# Patient Record
Sex: MALE | Race: WHITE | Employment: FULL TIME | URBAN - METROPOLITAN AREA
[De-identification: names, ages, dates, MRNs, and addresses within clinical notes are randomized per-mention and may not be internally consistent; named-entity substitution may affect disease eponyms.]

---

## 2017-02-02 ENCOUNTER — TRANSFERRED RECORDS (OUTPATIENT)
Dept: HEALTH INFORMATION MANAGEMENT | Facility: CLINIC | Age: 47
End: 2017-02-02

## 2018-07-26 ENCOUNTER — TRANSFERRED RECORDS (OUTPATIENT)
Dept: HEALTH INFORMATION MANAGEMENT | Facility: CLINIC | Age: 48
End: 2018-07-26

## 2018-09-13 ENCOUNTER — TRANSFERRED RECORDS (OUTPATIENT)
Dept: HEALTH INFORMATION MANAGEMENT | Facility: CLINIC | Age: 48
End: 2018-09-13

## 2018-10-09 ENCOUNTER — PRE VISIT (OUTPATIENT)
Dept: SURGERY | Facility: CLINIC | Age: 48
End: 2018-10-09

## 2018-10-09 NOTE — TELEPHONE ENCOUNTER
Date of appointment: 10/24/2018   Diagnosis/reason for appointment:  Diaphragm Parlaysis    Additional information:  [facility]

## 2018-10-09 NOTE — LETTER
10/9/2018        RE: Hal Clark  [address]  [place] [address]        Date of appointment: 10/24/2018   Diagnosis/reason for appointment:  Diaphragm Parlaysis    Additional information:  [facility]        October 9, 2018 3:29 PM  Records received from [place] and faxed to HIM    I called Mr Clark today to notify him of his surgery being cancelled for tomorrow (Lap / thoracoscopic R diaphragm plication).     He was seen in PAC 10/26 and was found to have hyponatremia, mildly elevated AST, ALT, total bili, and thrombocytopenia. INR was not checked. He does not have history of known liver disease however, he admits to 2-3 vodka drinks a day. I am afraid he might have undiagnosed cirrhosis that is not currently treated.     We decided to cancel surgery until he undergoes further liver work up and optimization from medical standpoint. We will have our office coordinate with him a follow up so we can schedule him for surgery as soon as possible.     Winston Colon MD          Sincerely,        Kolton Robbins MD
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
## 2018-10-24 ENCOUNTER — TELEPHONE (OUTPATIENT)
Dept: SURGERY | Facility: CLINIC | Age: 48
End: 2018-10-24

## 2018-10-24 ENCOUNTER — OFFICE VISIT (OUTPATIENT)
Dept: SURGERY | Facility: CLINIC | Age: 48
End: 2018-10-24
Attending: THORACIC SURGERY (CARDIOTHORACIC VASCULAR SURGERY)
Payer: COMMERCIAL

## 2018-10-24 ENCOUNTER — HOSPITAL ENCOUNTER (INPATIENT)
Facility: CLINIC | Age: 48
Setting detail: SURGERY ADMIT
End: 2018-10-24
Attending: THORACIC SURGERY (CARDIOTHORACIC VASCULAR SURGERY) | Admitting: THORACIC SURGERY (CARDIOTHORACIC VASCULAR SURGERY)
Payer: COMMERCIAL

## 2018-10-24 VITALS
BODY MASS INDEX: 40.05 KG/M2 | WEIGHT: 286.1 LBS | HEART RATE: 81 BPM | OXYGEN SATURATION: 95 % | HEIGHT: 71 IN | SYSTOLIC BLOOD PRESSURE: 131 MMHG | DIASTOLIC BLOOD PRESSURE: 90 MMHG | TEMPERATURE: 98.4 F

## 2018-10-24 DIAGNOSIS — J98.6 DIAPHRAGM PARALYSIS: Primary | ICD-10-CM

## 2018-10-24 DIAGNOSIS — J98.6 PARALYZED HEMIDIAPHRAGM: ICD-10-CM

## 2018-10-24 PROCEDURE — G0463 HOSPITAL OUTPT CLINIC VISIT: HCPCS | Mod: ZF

## 2018-10-24 RX ORDER — PROPRANOLOL HYDROCHLORIDE 80 MG/1
80 CAPSULE, EXTENDED RELEASE ORAL EVERY MORNING
COMMUNITY
Start: 2018-05-31

## 2018-10-24 RX ORDER — SUMATRIPTAN 100 MG/1
100 TABLET, FILM COATED ORAL
COMMUNITY
Start: 2018-08-20

## 2018-10-24 RX ORDER — TRIAMTERENE/HYDROCHLOROTHIAZID 37.5-25 MG
TABLET ORAL
COMMUNITY
Start: 2018-10-17 | End: 2018-11-20

## 2018-10-24 RX ORDER — ACETAMINOPHEN 500 MG
500-1000 TABLET ORAL
COMMUNITY
Start: 2018-03-06 | End: 2018-10-26

## 2018-10-24 RX ORDER — CEFAZOLIN SODIUM IN 0.9 % NACL 3 G/100 ML
3 INTRAVENOUS SOLUTION, PIGGYBACK (ML) INTRAVENOUS
Status: CANCELLED | OUTPATIENT
Start: 2018-10-24

## 2018-10-24 RX ORDER — ALBUTEROL SULFATE 90 UG/1
2 AEROSOL, METERED RESPIRATORY (INHALATION) PRN
COMMUNITY
Start: 2018-06-27

## 2018-10-24 RX ORDER — FLUTICASONE PROPIONATE 50 MCG
2 SPRAY, SUSPENSION (ML) NASAL EVERY MORNING
COMMUNITY
Start: 2018-05-30

## 2018-10-24 RX ORDER — HEPARIN SODIUM 5000 [USP'U]/.5ML
5000 INJECTION, SOLUTION INTRAVENOUS; SUBCUTANEOUS
Status: CANCELLED | OUTPATIENT
Start: 2018-10-24

## 2018-10-24 RX ORDER — CEFAZOLIN SODIUM 1 G/50ML
1 INJECTION, SOLUTION INTRAVENOUS SEE ADMIN INSTRUCTIONS
Status: CANCELLED | OUTPATIENT
Start: 2018-10-24

## 2018-10-24 RX ORDER — CYCLOBENZAPRINE HCL 10 MG
10 TABLET ORAL PRN
COMMUNITY
Start: 2018-05-15

## 2018-10-24 RX ORDER — BUSPIRONE HYDROCHLORIDE 5 MG/1
5 TABLET ORAL EVERY MORNING
COMMUNITY
Start: 2018-03-19 | End: 2019-03-19

## 2018-10-24 ASSESSMENT — PAIN SCALES - GENERAL: PAINLEVEL: NO PAIN (0)

## 2018-10-24 NOTE — TELEPHONE ENCOUNTER
Spoke with patient to schedule surgery with Dr Winston Gallagher    Surgery was scheduled on 10/29/18 at [facility]    Patient will have H&P with PAC on 10/26/18    Post-Op care appointment scheduled?  YES 11/27/18 post op, PFT's, Chest XR    Patient is aware a / is needed day of surgery.     Surgery packet will be brought to him during PAC, patient has my direct contact information for any further questions.     Additional Comments:

## 2018-10-24 NOTE — NURSING NOTE
"Oncology Rooming Note    October 24, 2018 1:26 PM   Hal Clark is a 47 year old male who presents for:    Chief Complaint   Patient presents with     Oncology Clinic Visit     New; Diaphragm Paralysis     Initial Vitals: /90  Pulse 81  Temp 98.4  F (36.9  C) (Oral)  Ht 1.8 m (5' 10.87\")  Wt 129.8 kg (286 lb 1.6 oz)  SpO2 95%  BMI 40.05 kg/m2 Estimated body mass index is 40.05 kg/(m^2) as calculated from the following:    Height as of this encounter: 1.8 m (5' 10.87\").    Weight as of this encounter: 129.8 kg (286 lb 1.6 oz). Body surface area is 2.55 meters squared.  No Pain (0) Comment: Data Unavailable   No LMP for male patient.  Allergies reviewed: Yes  Medications reviewed: Yes    Medications: Medication refills not needed today.  Pharmacy name entered into EPIC: Data Unavailable    Clinical concerns:      10 minutes for nursing intake (face to face time)     Lissette Harden CMA              "

## 2018-10-24 NOTE — MR AVS SNAPSHOT
After Visit Summary   10/24/2018    Hal Clark    MRN: [id number]           Patient Information     Date Of Birth          1970        Visit Information        Provider Department      10/24/2018 12:30 PM Winston Yeh MD [facility]        Today's Diagnoses     Diaphragm paralysis    -  1    Paralyzed hemidiaphragm           Follow-ups after your visit        Additional Services     PAC Visit Referral (For [facility] Only)       Does this visit require an Anesthesia consult?  [facility]    H&P done by:  Other (Specify): PAC      Please be aware that coverage of these services is subject to the terms and limitations of your health insurance plan.  Call member services at your health plan with any benefit or coverage questions.      Please bring the following to your appointment:  >>   Any x-rays, CTs or MRIs which have been performed.  Contact the facility where they were done to arrange for  prior to your scheduled appointment.  Any new CT, MRI or other procedures ordered by your specialist must be performed at a [facility] facility or coordinated by your clinic's referral office.    >>   List of current medications  >>   This referral request   >>   Any documents/labs given to you for this referral                  Your next 10 appointments already scheduled     Oct 29, 2018   Procedure with Winston Colon MD   [facility], Same Day Surgery (--)    [address]  [facility] [place] [id number] [phone]              Future tests that were ordered for you today     Open Future Orders        Priority Expected Expires Ordered    X-ray Chest 2 vws* Routine 10/24/2018 10/24/2019 10/24/2018    Pulmonary function test procedure (Sitting/Supine)  [PFT13] Routine  10/24/2019 10/24/2018            Who to contact     If you have questions or need follow up information about today's clinic visit or your schedule please contact [facility] directly at  "[phone].  Normal or non-critical lab and imaging results will be communicated to you by MyChart, letter or phone within 4 business days after the clinic has received the results. If you do not hear from us within 7 days, please contact the clinic through [employer] or phone. If you have a critical or abnormal lab result, we will notify you by phone as soon as possible.  Submit refill requests through [employer] or call your pharmacy and they will forward the refill request to us. Please allow 3 business days for your refill to be completed.          Additional Information About Your Visit        [employer] Information     [employer] lets you send messages to your doctor, view your test results, renew your prescriptions, schedule appointments and more. To sign up, go to [email or web address] . Click on \"Log in\" on the left side of the screen, which will take you to the Welcome page. Then click on \"Sign up Now\" on the right side of the page.     You will be asked to enter the access code listed below, as well as some personal information. Please follow the directions to create your username and password.     Your access code is: [email or web address]  Expires: 2019  2:30 PM     Your access code will  in 90 days. If you need help or a new code, please call your [facility] clinic or [phone].        Care EveryWhere ID     This is your Care EveryWhere ID. This could be used by other organizations to access your [place] medical records  [id number]        Your Vitals Were     Pulse Temperature Height Pulse Oximetry BMI (Body Mass Index)       81 98.4  F (36.9  C) (Oral) 1.8 m (5' 10.87\") 95% 40.05 kg/m2        Blood Pressure from Last 3 Encounters:   10/24/18 131/90    Weight from Last 3 Encounters:   10/24/18 129.8 kg (286 lb 1.6 oz)              We Performed the Following     PAC Visit Referral (For [facility] Only)     [place]-Operative Worksheet (Thoracic)        Primary Care Provider    None Specified       No primary " provider on file.        Equal Access to Services     VASU [place]IN : Hadii aad ku hadjuliannjesus alberto Daleali, wawendida luskipha, qaangieta karomelchristelle rawls. So [facility] [phone].    ATENCIÓN: Si habla español, tiene a godoy disposición servicios gratuitos de asistencia lingüística. [facility] [phone].    We comply with applicable federal civil rights laws and [place] laws. We do not discriminate on the basis of race, color, national origin, age, disability, sex, sexual orientation, or gender identity.            Thank you!     Thank you for choosing [facility]  for your care. Our goal is always to provide you with excellent care. Hearing back from our patients is one way we can continue to improve our services. Please take a few minutes to complete the written survey that you may receive in the mail after your visit with us. Thank you!             Your Updated Medication List - Protect others around you: Learn how to safely use, store and throw away your medicines at www.disposemymeds.org.          This list is accurate as of 10/24/18  4:07 PM.  Always use your most recent med list.                   Brand Name Dispense Instructions for use Diagnosis    acetaminophen 500 MG tablet    TYLENOL     Take 500-1,000 mg by mouth        albuterol 108 (90 Base) MCG/ACT inhaler    PROAIR HFA/PROVENTIL HFA/VENTOLIN HFA     Inhale 2 puffs into the lungs        busPIRone 5 MG tablet    BUSPAR     Take 5 mg by mouth        cyclobenzaprine 10 MG tablet    FLEXERIL     Take 10 mg by mouth        fluticasone 50 MCG/ACT spray    FLONASE     2 sprays        propranolol 80 MG 24 hr capsule    INDERAL LA     Take 80 mg by mouth        sertraline 50 MG tablet    ZOLOFT     TAKE 1 TABLET BY MOUTH EVERY DAY        SUMAtriptan 100 MG tablet    IMITREX     Take 100 mg by mouth        triamterene-hydrochlorothiazide 37.5-25 MG per tablet    MAXZIDE-25     TAKE 0.5 TABLETS BY MOUTH DAILY.

## 2018-10-25 ENCOUNTER — ANESTHESIA EVENT (OUTPATIENT)
Dept: SURGERY | Facility: CLINIC | Age: 48
End: 2018-10-25

## 2018-10-26 ENCOUNTER — OFFICE VISIT (OUTPATIENT)
Dept: SURGERY | Facility: CLINIC | Age: 48
End: 2018-10-26
Payer: COMMERCIAL

## 2018-10-26 VITALS
SYSTOLIC BLOOD PRESSURE: 105 MMHG | HEART RATE: 92 BPM | BODY MASS INDEX: 39.89 KG/M2 | TEMPERATURE: 97.9 F | RESPIRATION RATE: 18 BRPM | OXYGEN SATURATION: 95 % | WEIGHT: 284.9 LBS | DIASTOLIC BLOOD PRESSURE: 79 MMHG | HEIGHT: 71 IN

## 2018-10-26 DIAGNOSIS — Z01.818 PREOP EXAMINATION: ICD-10-CM

## 2018-10-26 DIAGNOSIS — Z01.818 PREOP EXAMINATION: Primary | ICD-10-CM

## 2018-10-26 LAB
ABO + RH BLD: NORMAL
ABO + RH BLD: NORMAL
ALBUMIN SERPL-MCNC: 3.8 G/DL (ref 3.4–5)
ALP SERPL-CCNC: 70 U/L (ref 40–150)
ALT SERPL W P-5'-P-CCNC: 69 U/L (ref 0–70)
ANION GAP SERPL CALCULATED.3IONS-SCNC: 12 MMOL/L (ref 3–14)
AST SERPL W P-5'-P-CCNC: 115 U/L (ref 0–45)
BILIRUB SERPL-MCNC: 1.7 MG/DL (ref 0.2–1.3)
BLD GP AB SCN SERPL QL: NORMAL
BLOOD BANK CMNT PATIENT-IMP: NORMAL
BLOOD BANK CMNT PATIENT-IMP: NORMAL
BUN SERPL-MCNC: 12 MG/DL (ref 7–30)
CALCIUM SERPL-MCNC: 9.1 MG/DL (ref 8.5–10.1)
CHLORIDE SERPL-SCNC: 87 MMOL/L (ref 94–109)
CO2 SERPL-SCNC: 30 MMOL/L (ref 20–32)
CREAT SERPL-MCNC: 0.87 MG/DL (ref 0.66–1.25)
ERYTHROCYTE [DISTWIDTH] IN BLOOD BY AUTOMATED COUNT: 14.5 % (ref 10–15)
GFR SERPL CREATININE-BSD FRML MDRD: >90 ML/MIN/1.7M2
GLUCOSE SERPL-MCNC: 93 MG/DL (ref 70–99)
HCT VFR BLD AUTO: 41.8 % (ref 40–53)
HGB BLD-MCNC: 14.4 G/DL (ref 13.3–17.7)
MCH RBC QN AUTO: 34 PG (ref 26.5–33)
MCHC RBC AUTO-ENTMCNC: 34.4 G/DL (ref 31.5–36.5)
MCV RBC AUTO: 99 FL (ref 78–100)
PLATELET # BLD AUTO: 85 10E9/L (ref 150–450)
POTASSIUM SERPL-SCNC: 3.8 MMOL/L (ref 3.4–5.3)
PROT SERPL-MCNC: 8.6 G/DL (ref 6.8–8.8)
RBC # BLD AUTO: 4.24 10E12/L (ref 4.4–5.9)
SODIUM SERPL-SCNC: 129 MMOL/L (ref 133–144)
SPECIMEN EXP DATE BLD: NORMAL
WBC # BLD AUTO: 5.5 10E9/L (ref 4–11)

## 2018-10-26 RX ORDER — GABAPENTIN 300 MG/1
300 CAPSULE ORAL ONCE
Status: CANCELLED | OUTPATIENT
Start: 2018-10-26 | End: 2018-10-26

## 2018-10-26 RX ORDER — CHLORHEXIDINE GLUCONATE ORAL RINSE 1.2 MG/ML
15 SOLUTION DENTAL ONCE
Status: CANCELLED | OUTPATIENT
Start: 2018-10-26 | End: 2018-10-26

## 2018-10-26 RX ORDER — AMLODIPINE BESYLATE 5 MG/1
5 TABLET ORAL EVERY MORNING
COMMUNITY

## 2018-10-26 RX ORDER — ALBUTEROL SULFATE 0.83 MG/ML
2.5 SOLUTION RESPIRATORY (INHALATION) ONCE
Status: CANCELLED | OUTPATIENT
Start: 2018-10-26 | End: 2018-10-26

## 2018-10-26 RX ORDER — ACETAMINOPHEN 325 MG/1
975 TABLET ORAL ONCE
Status: CANCELLED | OUTPATIENT
Start: 2018-10-26 | End: 2018-10-26

## 2018-10-26 ASSESSMENT — ENCOUNTER SYMPTOMS: ORTHOPNEA: 1

## 2018-10-26 ASSESSMENT — LIFESTYLE VARIABLES: TOBACCO_USE: 1
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
## 2018-10-26 NOTE — PATIENT INSTRUCTIONS
Preparing for Your Surgery      Name:  Hal Clark   MRN:  [id number]   :  1970   Today's Date:  10/26/2018     Arriving for surgery:  Surgery date:  10/29/18  Arrival time:  9:40AM   Please come to:       [facility] [address]  [address] [place]  [address]    -   parking is available in front of the hospital from 5:15 am to 8:00 pm    -  Stop at the Information Desk in the lobby    -   Inform the information person that you are here for surgery. An escort to 3c will be provided. If you would not like an escort, please proceed to 3C on the 3rd floor. [phone]     What can I eat or drink?  -  You may have solid food or milk products until 8 hours prior to your surgery. 10/29/18, 4AM  -  You may have water, apple juice or 7up/Sprite until 2 hours prior to your surgery. 10/29/18, 9:40AM     Which medicines can I take?  Hold Ibuprofen and Imitrex for 24 hours prior to surgery.   -  Do NOT take these medications in the morning, the day of surgery:    Triamterene-Hydrochlorothiazide    -  Please take these medications the day of surgery:    Amlodipine Buspirone   Inderal  Zoloft    Flonase nasal spray     As needed:   Albuterol     How do I prepare myself?  -  Take two showers: one the night before surgery; and one the morning of surgery.         Use Scrubcare or Hibiclens to wash from neck down.  You may use your own shampoo and conditioner. No other hair products.   -  Do NOT use lotion, powder, deodorant, or antiperspirant the day of your surgery.  -  Do NOT wear jewelry.  - Do not bring your own medications to the hospital, except for inhalers and eye drops.  -  Bring your ID and insurance card.    Questions or Concerns:  -If you have questions or concerns regarding the day of surgery, please call [phone].     -For questions after surgery please call your surgeons office.           Enhanced Recovery After Surgery     This is a team effort, including  you, to get you back on your feet, eating and drinking normally and out of the hospital as quickly as possible.  The goals are: 1) NO INFECTIONS and   2) RETURN TO NORMAL DIET    How can we achieve these goals?  1) STAY ACTIVE: Walk every day before your surgery; try to increase the amount every day.  Walk after surgery as much as you can-the nurses will help you.  Walking speeds healing and gets you home quicker, you heal better at home and have less risk of infection.       2) STAY HYDRATED: Drink clear liquids up until 2 hours before your surgery. We would like you to purchase a drink such as Gatorade or Ensure Clear (not the milkshake type).  Drink this before bedtime and on the way into the hospital, drink between 8-10 ounces or until you feel hydrated.  Keeping well hydrated leads to your veins being plump, you wake up faster, and you are less likely to be nauseated. Start drinking water as soon as you can after surgery and advance to clear liquids and food as tolerated.  IV fluids contain salt, drinking fluids will minimize the amount of IV fluids you need and decrease the amount of salt you get.    The most common reason for the patient to be readmitted is dehydration. Staying hydrated after you go home from the hospital is very important.  Ensure or Ensure Clear are good options to keep you hydrated.     3) PAIN MANAGEMENT: If we minimize the amount of opioids and narcotics, and use regional blocks (which numb the area where your surgery is) along with oral pain medications; you will have less side effects of nausea and constipation. Narcotics can slow down your bowels and cause you to stay in the hospital longer.     Our goal is to keep you comfortable; eating and drinking normally and back home safely.     AFTER YOUR SURGERY  Breathing exercises   Breathing exercises help you recover faster. Take deep breaths and let the air out slowly. This will:     Help you wake up after surgery.    Help prevent  complications like pneumonia.  Preventing complications will help you go home sooner.   We may give you a breathing device (incentive spirometer) to encourage you to breathe deeply.   Nausea and vomiting   You may feel sick to your stomach after surgery; if so, let your nurse know.    Pain control:  After surgery, you may have pain. Our goal is to help you manage your pain. Pain medicine will help you feel comfortable enough to do activities that will help you heal.  These activities may include breathing exercises, walking and physical therapy.   To help your health care team treat your pain we will ask: 1) If you have pain  2) where it is located 3) describe your pain in your words  Methods of pain control include medications given by mouth, vein or by nerve block for some surgeries.  Sequential Compression Device (SCD) or Pneumo Boots:  You may need to wear SCD S on your legs or feet. These are wraps connected to a machine that pumps in air and releases it. The repeated pumping helps prevent blood clots from forming.

## 2018-10-26 NOTE — ANESTHESIA PREPROCEDURE EVALUATION
Anesthesia Evaluation     . Pt has had prior anesthetic. Type: General    No history of anesthetic complications          ROS/MED HX    ENT/Pulmonary:     (+)sleep apnea, allergic rhinitis, tobacco use, Current use Daily chewing tobacco use packs/day  uses CPAP , . Other pulmonary disease Right hemidiaphragmatic paralysis.  .   (-) recent URI   Neurologic:     (+)migraines, other neuro History of TBI (concussions x 2) this year due to falls.      Cardiovascular:     (+) hypertension----. : . . HESTER, orthopnea, . :. . Previous cardiac testing Echodate:7/26/2018results:Normal LV size and systolic function with estimated ejection fraction of >70%.   No significant aortic stenosis.   No significant mitral stenosis.   Mildly dilated ascending aorta, measuring 3.9 cm.   Dilated IVC.   Estimated EF: >70%date: results: date: results: date: results:         (-) taking anticoagulants/antiplatelets, syncope and irregular heartbeat/palpitations   METS/Exercise Tolerance:  4 - Raking leaves, gardening   Hematologic:     (+) History of Transfusion no previous transfusion reaction -      Musculoskeletal:   (+) , , other musculoskeletal- Decreased strength and ROM in the right arm.        GI/Hepatic:  - neg GI/hepatic ROS       Renal/Genitourinary:     (+) Other Renal/ Genitourinary, Notes urinary urgency after being active.        Endo:     (+) Obesity, .      Psychiatric:     (+) psychiatric history depression and anxiety      Infectious Disease:  - neg infectious disease ROS       Malignancy:      - no malignancy   Other:    - neg other ROS                           PAC Discussion and Assessment    ASA Classification: 3  Case is suitable for: [place]  Anesthetic techniques and relevant risks discussed: GA and GA with regional block for post-op pain control  Invasive monitoring and risk discussed:   Types:   Possibility and Risk of blood transfusion discussed:   NPO instructions given:   Additional anesthetic preparation and  risks discussed:   Needs early admission to pre-op area:   Other:     PAC Resident/NP Anesthesia Assessment:  Hal Clark is a 47 year old male scheduled to undergo Laparoscopic/Thoracoscopic Right Diaphragmatic Plication (Right Chest), Possible Right Thoracotomy on 10/29/18 with Dr. Elliott Colon.    He has the following specific operative considerations:   1.  Diagnosis of obstructive sleep apnea with intermittent CPAP use: Recommend careful positioning to prevent airway compromise and close monitoring of the patient's respiratory status.    2.  Right hemidiaphragmatic paralysis: As the patient becomes quite short of breath and anxiety when attempting to lie flat, may need to provide some sedation and supplemental oxygen prior to induction.  In addition, the patient reports that his O2 sats will drop into the 70s during the night due to a combination of his hemidiaphragmatic paralysis and VAN.  Recommend close monitoring of the patient's respiratory and ventilatory status throughout the perioperative period.  3.  HTN: Patient instructed to continue Propranolol as prescribed, but hold Maxzide the AM of surgery.  4.  Obesity: Recommend careful positioning to prevent airway/ventilatory compromise, or tissue injury.    5.  Decreased strength and ROM in the right upper extremity: Recommend that extra caution be exercised during positioning and transfers in order to prevent injury.  6.  Type & screen, CBC, CMP today.  Orders entered for a Duo-Neb to be administered in Pre-Op, as well as the [facility] Thoracic Surgery orders.    ADDENDUM:  EPIC message sent to Dr. Elliott Colon regarding low Na, elevated AST, and low platelet count on today's labs.  Will defer any changes in the plan of care to Dr. Elliott Colon.    Revised Cardiac Risk Index: 0.9% risk of major adverse cardiac event.  VAN risk: Diagnosed with VAN, with intermittent CPAP use.  PONV risk score= 2.  (If > 2, anti-emetic intervention is  recommended.)  Anesthesia considerations: Refer to PAC assessment in the anesthesia records.      Reviewed and Signed by PAC Mid-Level Provider/Resident  Mid-Level Provider/Resident: Danette Carlson CNP  Date: 10/26/18  Time:     Attending Anesthesiologist Anesthesia Assessment:  47 year old for diaphragm plication in management of hemidiaphragm paralysis, right. His diaphragm paralysis is really significant, such that there is little lung ventilation on that side - see the CT chest image in Elliott Colon note. Per his report, his sats on awakening can be in the 70's, and he is very . He cannot lie flat, gets very panicky (may need to induce and preoxygenate more upright.). Given his pulmonary status, would consider art line, perhaps even prior to induction. Assess DOS.    No cardiac disease, echo essentially normal.    No need to see patient. Patient is appropriate for the planned procedure without further work-up or medical management.      Reviewed and Signed by PAC Anesthesiologist  Anesthesiologist: josephine  Date: 10/26/2018  Time:   Pass/Fail: Pass  Disposition:     PAC Pharmacist Assessment:        Pharmacist:   Date:   Time:                           .

## 2018-10-26 NOTE — MR AVS SNAPSHOT
After Visit Summary   10/26/2018    Hal Clark    MRN: [id number]           Patient Information     Date Of Birth          1970        Visit Information        Provider Department      10/26/2018 11:30 AM Danette Carlson NP [facility]        Today's Diagnoses     Preop examination    -  1      Care Instructions    Preparing for Your Surgery      Name:  Hal Clark   MRN:  [id number]   :  1970   Today's Date:  10/26/2018     Arriving for surgery:  Surgery date:  10/29/18  Arrival time:  9:40AM   Please come to:       [facility] [address]  [address] [place] [address]    -   parking is available in front of the hospital from 5:15 am to 8:00 pm    -  Stop at the Information Desk in the lobby    -   Inform the information person that you are here for surgery. An escort to 3c will be provided. If you would not like an escort, please proceed to 3C on the 3rd floor. [phone]     What can I eat or drink?  -  You may have solid food or milk products until 8 hours prior to your surgery. 10/29/18, 4AM  -  You may have water, apple juice or 7up/Sprite until 2 hours prior to your surgery. 10/29/18, 9:40AM     Which medicines can I take?  Hold Ibuprofen and Imitrex for 24 hours prior to surgery.   -  Do NOT take these medications in the morning, the day of surgery:    Triamterene-Hydrochlorothiazide    -  Please take these medications the day of surgery:    Amlodipine Buspirone   Inderal  Zoloft    Flonase nasal spray     As needed:   Albuterol     How do I prepare myself?  -  Take two showers: one the night before surgery; and one the morning of surgery.         Use Scrubcare or Hibiclens to wash from neck down.  You may use your own shampoo and conditioner. No other hair products.   -  Do NOT use lotion, powder, deodorant, or antiperspirant the day of your surgery.  -  Do NOT wear jewelry.  - Do not bring  your own medications to the hospital, except for inhalers and eye drops.  -  Bring your ID and insurance card.    Questions or Concerns:  -If you have questions or concerns regarding the day of surgery, please call [phone].     -For questions after surgery please call your surgeons office.           Enhanced Recovery After Surgery     This is a team effort, including you, to get you back on your feet, eating and drinking normally and out of the hospital as quickly as possible.  The goals are: 1) NO INFECTIONS and   2) RETURN TO NORMAL DIET    How can we achieve these goals?  1) STAY ACTIVE: Walk every day before your surgery; try to increase the amount every day.  Walk after surgery as much as you can-the nurses will help you.  Walking speeds healing and gets you home quicker, you heal better at home and have less risk of infection.       2) STAY HYDRATED: Drink clear liquids up until 2 hours before your surgery. We would like you to purchase a drink such as Gatorade or Ensure Clear (not the milkshake type).  Drink this before bedtime and on the way into the hospital, drink between 8-10 ounces or until you feel hydrated.  Keeping well hydrated leads to your veins being plump, you wake up faster, and you are less likely to be nauseated. Start drinking water as soon as you can after surgery and advance to clear liquids and food as tolerated.  IV fluids contain salt, drinking fluids will minimize the amount of IV fluids you need and decrease the amount of salt you get.    The most common reason for the patient to be readmitted is dehydration. Staying hydrated after you go home from the hospital is very important.  Ensure or Ensure Clear are good options to keep you hydrated.     3) PAIN MANAGEMENT: If we minimize the amount of opioids and narcotics, and use regional blocks (which numb the area where your surgery is) along with oral pain medications; you will have less side effects of nausea and constipation.  Narcotics can slow down your bowels and cause you to stay in the hospital longer.     Our goal is to keep you comfortable; eating and drinking normally and back home safely.     AFTER YOUR SURGERY  Breathing exercises   Breathing exercises help you recover faster. Take deep breaths and let the air out slowly. This will:     Help you wake up after surgery.    Help prevent complications like pneumonia.  Preventing complications will help you go home sooner.   We may give you a breathing device (incentive spirometer) to encourage you to breathe deeply.   Nausea and vomiting   You may feel sick to your stomach after surgery; if so, let your nurse know.    Pain control:  After surgery, you may have pain. Our goal is to help you manage your pain. Pain medicine will help you feel comfortable enough to do activities that will help you heal.  These activities may include breathing exercises, walking and physical therapy.   To help your health care team treat your pain we will ask: 1) If you have pain  2) where it is located 3) describe your pain in your words  Methods of pain control include medications given by mouth, vein or by nerve block for some surgeries.  Sequential Compression Device (SCD) or Pneumo Boots:  You may need to wear SCD S on your legs or feet. These are wraps connected to a machine that pumps in air and releases it. The repeated pumping helps prevent blood clots from forming.             Follow-ups after your visit        Your next 10 appointments already scheduled     Oct 29, 2018   Procedure with Winston Colon MD   [facility], [place], Same Day Surgery (--)    [address] [facility] [address]   [phone]            Nov 27, 2018  9:30 AM CST   FULL PULMONARY FUNCTION with  PFL B   [facility] Pulmonary Function Testing ([facility])    [address]  3rd Floor  [facility] [address]   [phone]            Nov 27, 2018 11:00 AM CST   XR CHEST 2 VIEWS with  UCXR1   [facility] Xray ([facility] [place])    [address]  1st Floor  [facility] [place] [id number] [phone]           How do I prepare for my exam? (Food and drink instructions) No Food and Drink Restrictions.  How do I prepare for my exam? (Other instructions) You do not need to do anything special for this exam.  What should I wear: Wear comfortable clothes.  How long does the exam take: Most scans take less than 5 minutes.  What should I bring: Bring a list of your medicines, including vitamins, minerals and over-the-counter drugs. It is safest to leave personal items at home.  Do I need a :  No  is needed.  What do I need to tell my doctor: Tell your doctor if there s any chance you are pregnant.  What should I do after the exam: No restrictions, You may resume normal activities.  What is this test: An image of a specific body part shown in shades of black and white.  Who should I call with questions: If you have any questions, please call the Imaging Department where you will have your exam. Directions, parking instructions, and other information is available on our website, [email or web address].            Nov 27, 2018  1:00 PM CST   (Arrive by 12:45 PM)   Return Visit with EMILY Francois   [facility] ([facility] [place])    [address]  [address] [place] [address] [phone]              Future tests that were ordered for you today     Open Future Orders        Priority Expected Expires Ordered    ABO/Rh type and screen Routine 10/26/2018 11/25/2018 10/26/2018    CBC with platelets Routine 10/26/2018 11/25/2018 10/26/2018    Comprehensive metabolic panel Routine 10/26/2018 11/25/2018 10/26/2018            Who to contact     Please call your clinic at [phone] to:    Ask questions about your health    Make or cancel appointments    Discuss your medicines    Learn about your  "test results    Speak to your doctor            Additional Information About Your Visit        MyChart Information     [employer] is an electronic gateway that provides easy, online access to your medical records. With [employer], you can request a clinic appointment, read your test results, renew a prescription or communicate with your care team.     To sign up for [employer] visit the website at [email or web address]   You will be asked to enter the access code listed below, as well as some personal information. Please follow the directions to create your username and password.     Your access code is: [email or web address]  Expires: 2019  2:30 PM     Your access code will  in 90 days. If you need help or a new code, please contact your [facility] or call [phone] for assistance.        Care EveryWhere ID     This is your Care EveryWhere ID. This could be used by other organizations to access your [facility] medical records  [id number]        Your Vitals Were     Pulse Temperature Respirations Height Pulse Oximetry BMI (Body Mass Index)    92 97.9  F (36.6  C) (Oral) 18 1.8 m (5' 10.87\") 95% 39.88 kg/m2       Blood Pressure from Last 3 Encounters:   10/26/18 105/79   10/24/18 131/90    Weight from Last 3 Encounters:   10/26/18 129.2 kg (284 lb 14.4 oz)   10/24/18 129.8 kg (286 lb 1.6 oz)               Primary Care Provider Office Phone # Fax #    Adele Mishra [phone]       [facility] [address] [facility] [address]        Equal Access to Services     VASU AUSTIN : Hadii ben Coawn, waaxda luqadaha, qaybta kaalmachristelle rawls. So [facility] [place] [phone].    ATENCIÓN: Si habla español, tiene a godoy disposición servicios gratuitos de asistencia lingüística. Llame al [phone].    We comply with applicable federal civil rights laws and [place] laws. We do not discriminate on the basis " of race, color, national origin, age, disability, sex, sexual orientation, or gender identity.            Thank you!     Thank you for choosing [facility]  for your care. Our goal is always to provide you with excellent care. Hearing back from our patients is one way we can continue to improve our services. Please take a few minutes to complete the written survey that you may receive in the mail after your visit with us. Thank you!             Your Updated Medication List - Protect others around you: Learn how to safely use, store and throw away your medicines at www.disposemymeds.org.          This list is accurate as of 10/26/18 12:25 PM.  Always use your most recent med list.                   Brand Name Dispense Instructions for use Diagnosis    ADVIL PO      Take 400 mg by mouth as needed for moderate pain        albuterol 108 (90 Base) MCG/ACT inhaler    PROAIR HFA/PROVENTIL HFA/VENTOLIN HFA     Inhale 2 puffs into the lungs as needed        amLODIPine 5 MG tablet    NORVASC     Take 5 mg by mouth every morning        busPIRone 5 MG tablet    BUSPAR     Take 5 mg by mouth every morning        cyclobenzaprine 10 MG tablet    FLEXERIL     Take 10 mg by mouth as needed        fluticasone 50 MCG/ACT spray    FLONASE     Spray 2 sprays into both nostrils every morning        propranolol 80 MG 24 hr capsule    INDERAL LA     Take 80 mg by mouth every morning        sertraline 50 MG tablet    ZOLOFT     TAKE 1 TABLET BY MOUTH EVERY DAY IN THE MORNING        SUMAtriptan 100 MG tablet    IMITREX     Take 100 mg by mouth at onset of headache        triamterene-hydrochlorothiazide 37.5-25 MG per tablet    MAXZIDE-25     TAKE 0.5 TABLETS BY MOUTH DAILY IN THE MORNING

## 2018-10-28 NOTE — TELEPHONE ENCOUNTER
I called Mr Clark today to notify him of his surgery being cancelled for tomorrow (Lap / thoracoscopic R diaphragm plication).     He was seen in PAC 10/26 and was found to have hyponatremia, mildly elevated AST, ALT, total bili, and thrombocytopenia. INR was not checked. He does not have history of known liver disease however, he admits to 2-3 vodka drinks a day. I am afraid he might have undiagnosed cirrhosis that is not currently treated.     We decided to cancel surgery until he undergoes further liver work up and optimization from medical standpoint. We will have our office coordinate with him a follow up so we can schedule him for surgery as soon as possible.     Winston Colon MD

## 2018-10-29 ENCOUNTER — ANESTHESIA (OUTPATIENT)
Dept: SURGERY | Facility: CLINIC | Age: 48
End: 2018-10-29

## 2018-10-30 ENCOUNTER — TELEPHONE (OUTPATIENT)
Dept: SURGERY | Facility: CLINIC | Age: 48
End: 2018-10-30

## 2018-10-30 NOTE — TELEPHONE ENCOUNTER
Patient called to inquire if he should be contacting any specialist or needs further testing. He would like to proceed with surgery as soon as possible.    Please advise if assistance is needed in helping him schedule.

## 2018-10-31 DIAGNOSIS — K70.30 ALCOHOLIC CIRRHOSIS OF LIVER WITHOUT ASCITES (H): Primary | ICD-10-CM

## 2018-10-31 NOTE — PROGRESS NOTES
Patient requires further liver workup in Hepatology Clinic prior to scheduling surgery for diaphragm plication.   Has elevated LFT's, no INR checked.   Admits to drinking 3-5 vodka drinks/day.   May have undiagnosed cirrhosis.  Scheduling will help arrange this appointment asap.

## 2018-11-01 NOTE — TELEPHONE ENCOUNTER
Contacted GI, they are scheduling him with hepatology 11/26. He will be on the wait list for a sooner appointment

## 2018-11-07 ENCOUNTER — TELEPHONE (OUTPATIENT)
Dept: GASTROENTEROLOGY | Facility: CLINIC | Age: 48
End: 2018-11-07
Payer: COMMERCIAL

## 2018-11-07 NOTE — TELEPHONE ENCOUNTER
Health Call Center    Phone Message    May a detailed message be left on voicemail: yes    Reason for Call: patient is requesting a call regarding why he needs this appointment and what it will entail please contact patient.   Also please inform patient that he will need to come in either an hour prior to the appointment for labs or a week prior.     Action Taken: Message routed to:  [facility] ([facility] HEPATOLOGY

## 2018-11-08 DIAGNOSIS — K70.30 ALCOHOLIC CIRRHOSIS OF LIVER WITHOUT ASCITES (H): Primary | ICD-10-CM

## 2018-11-20 ENCOUNTER — OFFICE VISIT (OUTPATIENT)
Dept: GASTROENTEROLOGY | Facility: CLINIC | Age: 48
End: 2018-11-20
Attending: INTERNAL MEDICINE
Payer: COMMERCIAL

## 2018-11-20 ENCOUNTER — HOSPITAL ENCOUNTER (INPATIENT)
Facility: CLINIC | Age: 48
Setting detail: SURGERY ADMIT
End: 2018-11-20
Attending: THORACIC SURGERY (CARDIOTHORACIC VASCULAR SURGERY) | Admitting: THORACIC SURGERY (CARDIOTHORACIC VASCULAR SURGERY)
Payer: COMMERCIAL

## 2018-11-20 VITALS
DIASTOLIC BLOOD PRESSURE: 70 MMHG | HEART RATE: 78 BPM | BODY MASS INDEX: 40.54 KG/M2 | WEIGHT: 283.2 LBS | TEMPERATURE: 98.2 F | OXYGEN SATURATION: 98 % | HEIGHT: 70 IN | SYSTOLIC BLOOD PRESSURE: 112 MMHG

## 2018-11-20 DIAGNOSIS — K70.0 FATTY LIVER, ALCOHOLIC: ICD-10-CM

## 2018-11-20 DIAGNOSIS — K70.30 ALCOHOLIC CIRRHOSIS OF LIVER WITHOUT ASCITES (H): ICD-10-CM

## 2018-11-20 PROBLEM — F07.81 POST-CONCUSSION SYNDROME: Status: ACTIVE | Noted: 2018-05-23

## 2018-11-20 PROBLEM — F41.1 GAD (GENERALIZED ANXIETY DISORDER): Status: ACTIVE | Noted: 2017-09-25

## 2018-11-20 PROBLEM — G47.33 OSA (OBSTRUCTIVE SLEEP APNEA): Status: ACTIVE | Noted: 2018-08-22

## 2018-11-20 PROBLEM — F33.42 MAJOR DEPRESSIVE DISORDER, RECURRENT EPISODE, IN FULL REMISSION (H): Status: ACTIVE | Noted: 2017-09-25

## 2018-11-20 PROBLEM — I10 ESSENTIAL HYPERTENSION: Status: ACTIVE | Noted: 2017-01-03

## 2018-11-20 PROBLEM — G25.0 BENIGN ESSENTIAL TREMOR: Status: ACTIVE | Noted: 2018-03-12

## 2018-11-20 LAB
ALBUMIN SERPL-MCNC: 2.6 G/DL (ref 3.4–5)
ALP SERPL-CCNC: 69 U/L (ref 40–150)
ALT SERPL W P-5'-P-CCNC: 31 U/L (ref 0–70)
ANION GAP SERPL CALCULATED.3IONS-SCNC: 10 MMOL/L (ref 3–14)
AST SERPL W P-5'-P-CCNC: 20 U/L (ref 0–45)
BILIRUB DIRECT SERPL-MCNC: 0.2 MG/DL (ref 0–0.2)
BILIRUB SERPL-MCNC: 0.4 MG/DL (ref 0.2–1.3)
BUN SERPL-MCNC: 16 MG/DL (ref 7–30)
CALCIUM SERPL-MCNC: 8.2 MG/DL (ref 8.5–10.1)
CHLORIDE SERPL-SCNC: 102 MMOL/L (ref 94–109)
CO2 SERPL-SCNC: 25 MMOL/L (ref 20–32)
CREAT SERPL-MCNC: 0.86 MG/DL (ref 0.66–1.25)
ERYTHROCYTE [DISTWIDTH] IN BLOOD BY AUTOMATED COUNT: 14.1 % (ref 10–15)
GFR SERPL CREATININE-BSD FRML MDRD: >90 ML/MIN/1.7M2
GLUCOSE SERPL-MCNC: 94 MG/DL (ref 70–99)
HCT VFR BLD AUTO: 36.1 % (ref 40–53)
HGB BLD-MCNC: 12.3 G/DL (ref 13.3–17.7)
INR PPP: 0.99 (ref 0.86–1.14)
MCH RBC QN AUTO: 33.2 PG (ref 26.5–33)
MCHC RBC AUTO-ENTMCNC: 34.1 G/DL (ref 31.5–36.5)
MCV RBC AUTO: 98 FL (ref 78–100)
PLATELET # BLD AUTO: 293 10E9/L (ref 150–450)
POTASSIUM SERPL-SCNC: 3.4 MMOL/L (ref 3.4–5.3)
PROT SERPL-MCNC: 6.6 G/DL (ref 6.8–8.8)
RBC # BLD AUTO: 3.7 10E12/L (ref 4.4–5.9)
SODIUM SERPL-SCNC: 137 MMOL/L (ref 133–144)
WBC # BLD AUTO: 11.9 10E9/L (ref 4–11)

## 2018-11-20 PROCEDURE — G0463 HOSPITAL OUTPT CLINIC VISIT: HCPCS | Mod: ZF

## 2018-11-20 PROCEDURE — 80076 HEPATIC FUNCTION PANEL: CPT | Performed by: INTERNAL MEDICINE

## 2018-11-20 PROCEDURE — 36415 COLL VENOUS BLD VENIPUNCTURE: CPT | Performed by: INTERNAL MEDICINE

## 2018-11-20 PROCEDURE — 85027 COMPLETE CBC AUTOMATED: CPT | Performed by: INTERNAL MEDICINE

## 2018-11-20 PROCEDURE — 80048 BASIC METABOLIC PNL TOTAL CA: CPT | Performed by: INTERNAL MEDICINE

## 2018-11-20 PROCEDURE — 85610 PROTHROMBIN TIME: CPT | Performed by: INTERNAL MEDICINE

## 2018-11-20 ASSESSMENT — PAIN SCALES - GENERAL: PAINLEVEL: NO PAIN (0)
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
## 2018-11-20 NOTE — TELEPHONE ENCOUNTER
I was able to get Hal rescheduled for Hepatology and he was seen today.  He was told he is cleared for surgery.  His PAC was 10/26 and he is re-scheduled for surgery on 11/27. Since he did see Hep today, is it safe to assume his PAC will be sufficient for the pre-op?   I also rescheduled his follow up appointments for 12/26    Thanks!  Hilda
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
## 2018-11-20 NOTE — MR AVS SNAPSHOT
After Visit Summary   11/20/2018    Hal Clark    MRN: [id number]           Patient Information     Date Of Birth          1970        Visit Information        Provider Department      11/20/2018 8:00 AM Sebastián Martins MD [facility] Hepatology        Today's Diagnoses     Fatty liver, alcoholic           Follow-ups after your visit        Follow-up notes from your care team     Return if symptoms worsen or fail to improve.      Your next 10 appointments already scheduled     Nov 30, 2018   Procedure with Winston Colon MD   [facility], [place], Same Day Surgery (--)    [address]  [facility] [address]   [phone]            Dec 26, 2018  2:30 PM CST   FULL PULMONARY FUNCTION with  PFL D   [facility] Pulmonary Function Testing ([facility])    [address] [id number] [phone]            Dec 26, 2018  3:30 PM CST   XR CHEST 2 VIEWS with UCXR1   [facility] Xray ([facility])    [address] [place] [id number] [phone]           How do I prepare for my exam? (Food and drink instructions) No Food and Drink Restrictions.  How do I prepare for my exam? (Other instructions) You do not need to do anything special for this exam.  What should I wear: Wear comfortable clothes.  How long does the exam take: Most scans take less than 5 minutes.  What should I bring: Bring a list of your medicines, including vitamins, minerals and over-the-counter drugs. It is safest to leave personal items at home.  Do I need a :  No  is needed.  What do I need to tell my doctor: Tell your doctor if there s any chance you are pregnant.  What should I do after the exam: No restrictions, You may resume normal activities.  What is this test: An image of a specific body part shown in shades of black and white.  Who should I call with questions: If you have any questions, please  "call the Imaging Department where you will have your exam. Directions, parking instructions, and other information is available on our website, [email or web address].            Dec 26, 2018  4:00 PM CST   (Arrive by 3:45 PM)   Return Visit with EMILY Lemos   [facility] ([facility])    [address]  [address]  [facility] [address] [phone]              Who to contact     If you have questions or need follow up information about today's clinic visit or your schedule please contact [facility] HEPATOLOGY directly at [phone].  Normal or non-critical lab and imaging results will be communicated to you by MyChart, letter or phone within 4 business days after the clinic has received the results. If you do not hear from us within 7 days, please contact the clinic through Microblrhart or phone. If you have a critical or abnormal lab result, we will notify you by phone as soon as possible.  Submit refill requests through AddMyBest or call your pharmacy and they will forward the refill request to us. Please allow 3 business days for your refill to be completed.          Additional Information About Your Visit        MicroblrharFanChatter Information     AddMyBest gives you secure access to your electronic health record. If you see a primary care provider, you can also send messages to your care team and make appointments. If you have questions, please call your primary care clinic.  If you do not have a primary care provider, please call [phone] and they will assist you.        Care EveryWhere ID     This is your Care EveryWhere ID. This could be used by other organizations to access your [place] medical records  [id number]        Your Vitals Were     Pulse Temperature Height Pulse Oximetry BMI (Body Mass Index)       78 98.2  F (36.8  C) (Oral) 1.778 m (5' 10\") 98% 40.63 kg/m2        Blood Pressure from Last 3 Encounters:   11/20/18 112/70   10/26/18 105/79 "   10/24/18 131/90    Weight from Last 3 Encounters:   11/20/18 128.5 kg (283 lb 3.2 oz)   10/26/18 129.2 kg (284 lb 14.4 oz)   10/24/18 129.8 kg (286 lb 1.6 oz)              Today, you had the following     No orders found for display       Primary Care Provider Office Phone # Fax #    Adele Mishra, MOHAN [phone]       [facility] [address] [address] [facility] [address]        Equal Access to Services     [facility]: Hadii aad ku hadasho Soomaali, waaxda luqadaha, qaybta kaalmada adeegyada, waxandressa workman . So [facility] [phone].    ATENCIÓN: Si habla español, tiene a godoy disposición servicios gratuitos de asistencia lingüística. [facility] [phone].    We comply with applicable federal civil rights laws and [place] laws. We do not discriminate on the basis of race, color, national origin, age, disability, sex, sexual orientation, or gender identity.            Thank you!     Thank you for choosing [facility] HEPATOLOGY  for your care. Our goal is always to provide you with excellent care. Hearing back from our patients is one way we can continue to improve our services. Please take a few minutes to complete the written survey that you may receive in the mail after your visit with us. Thank you!             Your Updated Medication List - Protect others around you: Learn how to safely use, store and throw away your medicines at www.disposemymeds.org.          This list is accurate as of 11/20/18  3:38 PM.  Always use your most recent med list.                   Brand Name Dispense Instructions for use Diagnosis    albuterol 108 (90 Base) MCG/ACT inhaler    PROAIR HFA/PROVENTIL HFA/VENTOLIN HFA     Inhale 2 puffs into the lungs as needed        amLODIPine 5 MG tablet    NORVASC     Take 5 mg by mouth every morning        busPIRone 5 MG tablet    BUSPAR     Take 5 mg by mouth every morning        cyclobenzaprine 10 MG tablet    FLEXERIL     Take 10 mg by mouth  as needed        fluticasone 50 MCG/ACT spray    FLONASE     Spray 2 sprays into both nostrils every morning        propranolol 80 MG 24 hr capsule    INDERAL LA     Take 80 mg by mouth every morning        sertraline 50 MG tablet    ZOLOFT     TAKE 1 TABLET BY MOUTH EVERY DAY IN THE MORNING        SUMAtriptan 100 MG tablet    IMITREX     Take 100 mg by mouth at onset of headache
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
## 2018-11-20 NOTE — NURSING NOTE
Chief Complaint   Patient presents with     Consult     Alcoholic Cirrhosis   Pt roomed, vitals, meds, and allergies reviewed with pt. Pt ready for provider.  Martir Nazario, CMA

## 2018-11-22 ENCOUNTER — ANESTHESIA EVENT (OUTPATIENT)
Dept: SURGERY | Facility: CLINIC | Age: 48
DRG: 163 | End: 2018-11-22
Payer: COMMERCIAL

## 2018-11-26 ENCOUNTER — HOSPITAL ENCOUNTER (INPATIENT)
Facility: CLINIC | Age: 48
LOS: 7 days | Discharge: LEFT BEFORE DISCHARGE | DRG: 163 | End: 2018-12-03
Attending: THORACIC SURGERY (CARDIOTHORACIC VASCULAR SURGERY) | Admitting: THORACIC SURGERY (CARDIOTHORACIC VASCULAR SURGERY)
Payer: COMMERCIAL

## 2018-11-26 ENCOUNTER — APPOINTMENT (OUTPATIENT)
Dept: GENERAL RADIOLOGY | Facility: CLINIC | Age: 48
DRG: 163 | End: 2018-11-26
Attending: THORACIC SURGERY (CARDIOTHORACIC VASCULAR SURGERY)
Payer: COMMERCIAL

## 2018-11-26 ENCOUNTER — ANESTHESIA (OUTPATIENT)
Dept: SURGERY | Facility: CLINIC | Age: 48
DRG: 163 | End: 2018-11-26
Payer: COMMERCIAL

## 2018-11-26 DIAGNOSIS — J98.6 DIAPHRAGM PARALYSIS: Primary | ICD-10-CM

## 2018-11-26 LAB
ABO + RH BLD: NORMAL
ABO + RH BLD: NORMAL
BASE EXCESS BLDA CALC-SCNC: 0.5 MMOL/L
BLD GP AB SCN SERPL QL: NORMAL
BLD PROD TYP BPU: NORMAL
BLD UNIT ID BPU: 0
BLD UNIT ID BPU: 0
BLOOD BANK CMNT PATIENT-IMP: NORMAL
BLOOD PRODUCT CODE: NORMAL
BLOOD PRODUCT CODE: NORMAL
BPU ID: NORMAL
BPU ID: NORMAL
CA-I BLD-MCNC: 4.8 MG/DL (ref 4.4–5.2)
ERYTHROCYTE [DISTWIDTH] IN BLOOD BY AUTOMATED COUNT: 14.2 % (ref 10–15)
GLUCOSE BLD-MCNC: 96 MG/DL (ref 70–99)
GLUCOSE BLDC GLUCOMTR-MCNC: 90 MG/DL (ref 70–99)
HCO3 BLD-SCNC: 25 MMOL/L (ref 21–28)
HCT VFR BLD AUTO: 35.5 % (ref 40–53)
HGB BLD-MCNC: 10.9 G/DL (ref 13.3–17.7)
HGB BLD-MCNC: 11.7 G/DL (ref 13.3–17.7)
MCH RBC QN AUTO: 32.3 PG (ref 26.5–33)
MCHC RBC AUTO-ENTMCNC: 33 G/DL (ref 31.5–36.5)
MCV RBC AUTO: 98 FL (ref 78–100)
NUM BPU REQUESTED: 2
O2/TOTAL GAS SETTING VFR VENT: 100 %
PCO2 BLD: 40 MM HG (ref 35–45)
PH BLD: 7.41 PH (ref 7.35–7.45)
PLATELET # BLD AUTO: 235 10E9/L (ref 150–450)
PLATELET # BLD AUTO: 320 10E9/L (ref 150–450)
PO2 BLD: 172 MM HG (ref 80–105)
POTASSIUM BLD-SCNC: 3.4 MMOL/L (ref 3.4–5.3)
RBC # BLD AUTO: 3.62 10E12/L (ref 4.4–5.9)
SODIUM BLD-SCNC: 136 MMOL/L (ref 133–144)
SPECIMEN EXP DATE BLD: NORMAL
TRANSFUSION STATUS PATIENT QL: NORMAL
WBC # BLD AUTO: 9 10E9/L (ref 4–11)

## 2018-11-26 PROCEDURE — 85049 AUTOMATED PLATELET COUNT: CPT | Performed by: SURGERY

## 2018-11-26 PROCEDURE — 25000128 H RX IP 250 OP 636: Performed by: THORACIC SURGERY (CARDIOTHORACIC VASCULAR SURGERY)

## 2018-11-26 PROCEDURE — 25000125 ZZHC RX 250: Performed by: THORACIC SURGERY (CARDIOTHORACIC VASCULAR SURGERY)

## 2018-11-26 PROCEDURE — 36415 COLL VENOUS BLD VENIPUNCTURE: CPT | Performed by: SURGERY

## 2018-11-26 PROCEDURE — 25000128 H RX IP 250 OP 636: Performed by: STUDENT IN AN ORGANIZED HEALTH CARE EDUCATION/TRAINING PROGRAM

## 2018-11-26 PROCEDURE — 85027 COMPLETE CBC AUTOMATED: CPT | Performed by: ANESTHESIOLOGY

## 2018-11-26 PROCEDURE — 86900 BLOOD TYPING SEROLOGIC ABO: CPT | Performed by: THORACIC SURGERY (CARDIOTHORACIC VASCULAR SURGERY)

## 2018-11-26 PROCEDURE — 84132 ASSAY OF SERUM POTASSIUM: CPT | Performed by: THORACIC SURGERY (CARDIOTHORACIC VASCULAR SURGERY)

## 2018-11-26 PROCEDURE — 0BNC0ZZ RELEASE RIGHT UPPER LUNG LOBE, OPEN APPROACH: ICD-10-PCS | Performed by: THORACIC SURGERY (CARDIOTHORACIC VASCULAR SURGERY)

## 2018-11-26 PROCEDURE — 82803 BLOOD GASES ANY COMBINATION: CPT | Performed by: THORACIC SURGERY (CARDIOTHORACIC VASCULAR SURGERY)

## 2018-11-26 PROCEDURE — 86850 RBC ANTIBODY SCREEN: CPT | Performed by: THORACIC SURGERY (CARDIOTHORACIC VASCULAR SURGERY)

## 2018-11-26 PROCEDURE — 25000128 H RX IP 250 OP 636: Performed by: ANESTHESIOLOGY

## 2018-11-26 PROCEDURE — 40000275 ZZH STATISTIC RCP TIME EA 10 MIN

## 2018-11-26 PROCEDURE — 71000015 ZZH RECOVERY PHASE 1 LEVEL 2 EA ADDTL HR: Performed by: THORACIC SURGERY (CARDIOTHORACIC VASCULAR SURGERY)

## 2018-11-26 PROCEDURE — 82565 ASSAY OF CREATININE: CPT | Performed by: ANESTHESIOLOGY

## 2018-11-26 PROCEDURE — 37000008 ZZH ANESTHESIA TECHNICAL FEE, 1ST 30 MIN: Performed by: THORACIC SURGERY (CARDIOTHORACIC VASCULAR SURGERY)

## 2018-11-26 PROCEDURE — 25000132 ZZH RX MED GY IP 250 OP 250 PS 637: Performed by: ANESTHESIOLOGY

## 2018-11-26 PROCEDURE — 71000014 ZZH RECOVERY PHASE 1 LEVEL 2 FIRST HR: Performed by: THORACIC SURGERY (CARDIOTHORACIC VASCULAR SURGERY)

## 2018-11-26 PROCEDURE — P9016 RBC LEUKOCYTES REDUCED: HCPCS | Performed by: THORACIC SURGERY (CARDIOTHORACIC VASCULAR SURGERY)

## 2018-11-26 PROCEDURE — 37000009 ZZH ANESTHESIA TECHNICAL FEE, EACH ADDTL 15 MIN: Performed by: THORACIC SURGERY (CARDIOTHORACIC VASCULAR SURGERY)

## 2018-11-26 PROCEDURE — 36000064 ZZH SURGERY LEVEL 4 EA 15 ADDTL MIN - UMMC: Performed by: THORACIC SURGERY (CARDIOTHORACIC VASCULAR SURGERY)

## 2018-11-26 PROCEDURE — 40000171 ZZH STATISTIC PRE-PROCEDURE ASSESSMENT III: Performed by: THORACIC SURGERY (CARDIOTHORACIC VASCULAR SURGERY)

## 2018-11-26 PROCEDURE — 25000125 ZZHC RX 250: Performed by: NURSE ANESTHETIST, CERTIFIED REGISTERED

## 2018-11-26 PROCEDURE — 0BST0ZZ REPOSITION DIAPHRAGM, OPEN APPROACH: ICD-10-PCS | Performed by: THORACIC SURGERY (CARDIOTHORACIC VASCULAR SURGERY)

## 2018-11-26 PROCEDURE — 82330 ASSAY OF CALCIUM: CPT | Performed by: THORACIC SURGERY (CARDIOTHORACIC VASCULAR SURGERY)

## 2018-11-26 PROCEDURE — 40000986 XR CHEST PORT 1 VW

## 2018-11-26 PROCEDURE — 25000565 ZZH ISOFLURANE, EA 15 MIN: Performed by: THORACIC SURGERY (CARDIOTHORACIC VASCULAR SURGERY)

## 2018-11-26 PROCEDURE — 00000146 ZZHCL STATISTIC GLUCOSE BY METER IP

## 2018-11-26 PROCEDURE — 86901 BLOOD TYPING SEROLOGIC RH(D): CPT | Performed by: THORACIC SURGERY (CARDIOTHORACIC VASCULAR SURGERY)

## 2018-11-26 PROCEDURE — 82947 ASSAY GLUCOSE BLOOD QUANT: CPT | Performed by: THORACIC SURGERY (CARDIOTHORACIC VASCULAR SURGERY)

## 2018-11-26 PROCEDURE — 0BJ08ZZ INSPECTION OF TRACHEOBRONCHIAL TREE, VIA NATURAL OR ARTIFICIAL OPENING ENDOSCOPIC: ICD-10-PCS | Performed by: THORACIC SURGERY (CARDIOTHORACIC VASCULAR SURGERY)

## 2018-11-26 PROCEDURE — 40000014 ZZH STATISTIC ARTERIAL MONITORING DAILY

## 2018-11-26 PROCEDURE — 25000566 ZZH SEVOFLURANE, EA 15 MIN: Performed by: THORACIC SURGERY (CARDIOTHORACIC VASCULAR SURGERY)

## 2018-11-26 PROCEDURE — 36000062 ZZH SURGERY LEVEL 4 1ST 30 MIN - UMMC: Performed by: THORACIC SURGERY (CARDIOTHORACIC VASCULAR SURGERY)

## 2018-11-26 PROCEDURE — 25800025 ZZH RX 258: Performed by: SURGERY

## 2018-11-26 PROCEDURE — 36415 COLL VENOUS BLD VENIPUNCTURE: CPT | Performed by: ANESTHESIOLOGY

## 2018-11-26 PROCEDURE — 25000128 H RX IP 250 OP 636: Performed by: NURSE ANESTHETIST, CERTIFIED REGISTERED

## 2018-11-26 PROCEDURE — 84295 ASSAY OF SERUM SODIUM: CPT | Performed by: THORACIC SURGERY (CARDIOTHORACIC VASCULAR SURGERY)

## 2018-11-26 PROCEDURE — 27210794 ZZH OR GENERAL SUPPLY STERILE: Performed by: THORACIC SURGERY (CARDIOTHORACIC VASCULAR SURGERY)

## 2018-11-26 PROCEDURE — 12000006 ZZH R&B IMCU INTERMEDIATE UMMC

## 2018-11-26 PROCEDURE — 86923 COMPATIBILITY TEST ELECTRIC: CPT | Performed by: THORACIC SURGERY (CARDIOTHORACIC VASCULAR SURGERY)

## 2018-11-26 PROCEDURE — 25000132 ZZH RX MED GY IP 250 OP 250 PS 637: Performed by: SURGERY

## 2018-11-26 PROCEDURE — 25000128 H RX IP 250 OP 636: Performed by: SURGERY

## 2018-11-26 RX ORDER — FLUTICASONE PROPIONATE 50 MCG
2 SPRAY, SUSPENSION (ML) NASAL EVERY MORNING
Status: DISCONTINUED | OUTPATIENT
Start: 2018-11-27 | End: 2018-12-03 | Stop reason: HOSPADM

## 2018-11-26 RX ORDER — AMOXICILLIN 250 MG
1 CAPSULE ORAL 2 TIMES DAILY
Status: DISCONTINUED | OUTPATIENT
Start: 2018-11-26 | End: 2018-12-03 | Stop reason: HOSPADM

## 2018-11-26 RX ORDER — ONDANSETRON 2 MG/ML
4 INJECTION INTRAMUSCULAR; INTRAVENOUS EVERY 30 MIN PRN
Status: DISCONTINUED | OUTPATIENT
Start: 2018-11-26 | End: 2018-11-26 | Stop reason: HOSPADM

## 2018-11-26 RX ORDER — NALOXONE HYDROCHLORIDE 0.4 MG/ML
.1-.4 INJECTION, SOLUTION INTRAMUSCULAR; INTRAVENOUS; SUBCUTANEOUS
Status: DISCONTINUED | OUTPATIENT
Start: 2018-11-26 | End: 2018-11-26

## 2018-11-26 RX ORDER — ONDANSETRON 2 MG/ML
4 INJECTION INTRAMUSCULAR; INTRAVENOUS EVERY 30 MIN PRN
Status: DISCONTINUED | OUTPATIENT
Start: 2018-11-26 | End: 2018-11-26

## 2018-11-26 RX ORDER — AMLODIPINE BESYLATE 5 MG/1
5 TABLET ORAL EVERY MORNING
Status: DISCONTINUED | OUTPATIENT
Start: 2018-11-27 | End: 2018-11-27

## 2018-11-26 RX ORDER — GLYCOPYRROLATE 0.2 MG/ML
INJECTION, SOLUTION INTRAMUSCULAR; INTRAVENOUS PRN
Status: DISCONTINUED | OUTPATIENT
Start: 2018-11-26 | End: 2018-11-26

## 2018-11-26 RX ORDER — LIDOCAINE HYDROCHLORIDE 20 MG/ML
INJECTION, SOLUTION INFILTRATION; PERINEURAL PRN
Status: DISCONTINUED | OUTPATIENT
Start: 2018-11-26 | End: 2018-11-26

## 2018-11-26 RX ORDER — FLUMAZENIL 0.1 MG/ML
0.2 INJECTION, SOLUTION INTRAVENOUS
Status: DISCONTINUED | OUTPATIENT
Start: 2018-11-26 | End: 2018-11-26 | Stop reason: HOSPADM

## 2018-11-26 RX ORDER — IPRATROPIUM BROMIDE AND ALBUTEROL SULFATE 2.5; .5 MG/3ML; MG/3ML
3 SOLUTION RESPIRATORY (INHALATION)
Status: DISCONTINUED | OUTPATIENT
Start: 2018-11-26 | End: 2018-12-03 | Stop reason: HOSPADM

## 2018-11-26 RX ORDER — METOCLOPRAMIDE 5 MG/1
10 TABLET ORAL EVERY 6 HOURS PRN
Status: DISCONTINUED | OUTPATIENT
Start: 2018-11-26 | End: 2018-12-03 | Stop reason: HOSPADM

## 2018-11-26 RX ORDER — FENTANYL CITRATE 50 UG/ML
25-50 INJECTION, SOLUTION INTRAMUSCULAR; INTRAVENOUS EVERY 5 MIN PRN
Status: DISCONTINUED | OUTPATIENT
Start: 2018-11-26 | End: 2018-11-26 | Stop reason: HOSPADM

## 2018-11-26 RX ORDER — CYCLOBENZAPRINE HCL 10 MG
10 TABLET ORAL 2 TIMES DAILY PRN
Status: DISCONTINUED | OUTPATIENT
Start: 2018-11-26 | End: 2018-12-03 | Stop reason: HOSPADM

## 2018-11-26 RX ORDER — LIDOCAINE 40 MG/G
CREAM TOPICAL
Status: DISCONTINUED | OUTPATIENT
Start: 2018-11-26 | End: 2018-12-03 | Stop reason: HOSPADM

## 2018-11-26 RX ORDER — DEXTROSE MONOHYDRATE, SODIUM CHLORIDE, AND POTASSIUM CHLORIDE 50; 1.49; 4.5 G/1000ML; G/1000ML; G/1000ML
INJECTION, SOLUTION INTRAVENOUS CONTINUOUS
Status: DISCONTINUED | OUTPATIENT
Start: 2018-11-26 | End: 2018-11-27

## 2018-11-26 RX ORDER — NALBUPHINE HYDROCHLORIDE 10 MG/ML
2.5-5 INJECTION, SOLUTION INTRAMUSCULAR; INTRAVENOUS; SUBCUTANEOUS EVERY 6 HOURS PRN
Status: DISCONTINUED | OUTPATIENT
Start: 2018-11-26 | End: 2018-11-30

## 2018-11-26 RX ORDER — NALOXONE HYDROCHLORIDE 0.4 MG/ML
.1-.4 INJECTION, SOLUTION INTRAMUSCULAR; INTRAVENOUS; SUBCUTANEOUS
Status: DISCONTINUED | OUTPATIENT
Start: 2018-11-26 | End: 2018-12-03 | Stop reason: HOSPADM

## 2018-11-26 RX ORDER — METOCLOPRAMIDE HYDROCHLORIDE 5 MG/ML
10 INJECTION INTRAMUSCULAR; INTRAVENOUS EVERY 6 HOURS PRN
Status: DISCONTINUED | OUTPATIENT
Start: 2018-11-26 | End: 2018-12-03 | Stop reason: HOSPADM

## 2018-11-26 RX ORDER — HYDROMORPHONE HYDROCHLORIDE 1 MG/ML
.3-.5 INJECTION, SOLUTION INTRAMUSCULAR; INTRAVENOUS; SUBCUTANEOUS EVERY 10 MIN PRN
Status: DISCONTINUED | OUTPATIENT
Start: 2018-11-26 | End: 2018-11-26 | Stop reason: HOSPADM

## 2018-11-26 RX ORDER — HYDROMORPHONE HYDROCHLORIDE 1 MG/ML
.3-.5 INJECTION, SOLUTION INTRAMUSCULAR; INTRAVENOUS; SUBCUTANEOUS EVERY 10 MIN PRN
Status: DISCONTINUED | OUTPATIENT
Start: 2018-11-26 | End: 2018-11-26

## 2018-11-26 RX ORDER — PROPRANOLOL HYDROCHLORIDE 80 MG/1
80 CAPSULE, EXTENDED RELEASE ORAL EVERY MORNING
Status: DISCONTINUED | OUTPATIENT
Start: 2018-11-27 | End: 2018-12-03 | Stop reason: HOSPADM

## 2018-11-26 RX ORDER — ONDANSETRON 4 MG/1
4 TABLET, ORALLY DISINTEGRATING ORAL EVERY 6 HOURS PRN
Status: DISCONTINUED | OUTPATIENT
Start: 2018-11-26 | End: 2018-12-03 | Stop reason: HOSPADM

## 2018-11-26 RX ORDER — SODIUM CHLORIDE, SODIUM LACTATE, POTASSIUM CHLORIDE, CALCIUM CHLORIDE 600; 310; 30; 20 MG/100ML; MG/100ML; MG/100ML; MG/100ML
INJECTION, SOLUTION INTRAVENOUS CONTINUOUS
Status: DISCONTINUED | OUTPATIENT
Start: 2018-11-26 | End: 2018-11-26 | Stop reason: HOSPADM

## 2018-11-26 RX ORDER — CEFAZOLIN SODIUM IN 0.9 % NACL 3 G/100 ML
3 INTRAVENOUS SOLUTION, PIGGYBACK (ML) INTRAVENOUS
Status: COMPLETED | OUTPATIENT
Start: 2018-11-26 | End: 2018-11-26

## 2018-11-26 RX ORDER — SUMATRIPTAN 100 MG/1
100 TABLET, FILM COATED ORAL
Status: DISCONTINUED | OUTPATIENT
Start: 2018-11-26 | End: 2018-12-03 | Stop reason: HOSPADM

## 2018-11-26 RX ORDER — ACETAMINOPHEN 325 MG/1
975 TABLET ORAL EVERY 8 HOURS
Status: DISCONTINUED | OUTPATIENT
Start: 2018-11-26 | End: 2018-12-03 | Stop reason: HOSPADM

## 2018-11-26 RX ORDER — LABETALOL HYDROCHLORIDE 5 MG/ML
5 INJECTION, SOLUTION INTRAVENOUS EVERY 10 MIN PRN
Status: DISCONTINUED | OUTPATIENT
Start: 2018-11-26 | End: 2018-11-26 | Stop reason: HOSPADM

## 2018-11-26 RX ORDER — FENTANYL CITRATE 50 UG/ML
INJECTION, SOLUTION INTRAMUSCULAR; INTRAVENOUS PRN
Status: DISCONTINUED | OUTPATIENT
Start: 2018-11-26 | End: 2018-11-26

## 2018-11-26 RX ORDER — GABAPENTIN 300 MG/1
300 CAPSULE ORAL ONCE
Status: COMPLETED | OUTPATIENT
Start: 2018-11-26 | End: 2018-11-26

## 2018-11-26 RX ORDER — PROCHLORPERAZINE 25 MG
25 SUPPOSITORY, RECTAL RECTAL EVERY 12 HOURS PRN
Status: DISCONTINUED | OUTPATIENT
Start: 2018-11-26 | End: 2018-12-03 | Stop reason: HOSPADM

## 2018-11-26 RX ORDER — PROPOFOL 10 MG/ML
INJECTION, EMULSION INTRAVENOUS PRN
Status: DISCONTINUED | OUTPATIENT
Start: 2018-11-26 | End: 2018-11-26

## 2018-11-26 RX ORDER — FENTANYL CITRATE 50 UG/ML
25-50 INJECTION, SOLUTION INTRAMUSCULAR; INTRAVENOUS
Status: DISCONTINUED | OUTPATIENT
Start: 2018-11-26 | End: 2018-11-26 | Stop reason: HOSPADM

## 2018-11-26 RX ORDER — ONDANSETRON 4 MG/1
4 TABLET, ORALLY DISINTEGRATING ORAL EVERY 30 MIN PRN
Status: DISCONTINUED | OUTPATIENT
Start: 2018-11-26 | End: 2018-11-26 | Stop reason: HOSPADM

## 2018-11-26 RX ORDER — ONDANSETRON 2 MG/ML
4 INJECTION INTRAMUSCULAR; INTRAVENOUS EVERY 6 HOURS PRN
Status: DISCONTINUED | OUTPATIENT
Start: 2018-11-26 | End: 2018-12-03 | Stop reason: HOSPADM

## 2018-11-26 RX ORDER — ACETAMINOPHEN 325 MG/1
650 TABLET ORAL ONCE
Status: COMPLETED | OUTPATIENT
Start: 2018-11-26 | End: 2018-11-26

## 2018-11-26 RX ORDER — SERTRALINE HYDROCHLORIDE 25 MG/1
50 TABLET, FILM COATED ORAL DAILY
Status: DISCONTINUED | OUTPATIENT
Start: 2018-11-26 | End: 2018-12-03 | Stop reason: HOSPADM

## 2018-11-26 RX ORDER — HYDRALAZINE HYDROCHLORIDE 20 MG/ML
2.5-5 INJECTION INTRAMUSCULAR; INTRAVENOUS EVERY 10 MIN PRN
Status: DISCONTINUED | OUTPATIENT
Start: 2018-11-26 | End: 2018-11-26 | Stop reason: HOSPADM

## 2018-11-26 RX ORDER — FENTANYL CITRATE 50 UG/ML
25-50 INJECTION, SOLUTION INTRAMUSCULAR; INTRAVENOUS
Status: DISCONTINUED | OUTPATIENT
Start: 2018-11-26 | End: 2018-11-26

## 2018-11-26 RX ORDER — HEPARIN SODIUM 5000 [USP'U]/.5ML
5000 INJECTION, SOLUTION INTRAVENOUS; SUBCUTANEOUS EVERY 8 HOURS
Status: DISCONTINUED | OUTPATIENT
Start: 2018-11-27 | End: 2018-11-26

## 2018-11-26 RX ORDER — SODIUM CHLORIDE, SODIUM LACTATE, POTASSIUM CHLORIDE, CALCIUM CHLORIDE 600; 310; 30; 20 MG/100ML; MG/100ML; MG/100ML; MG/100ML
INJECTION, SOLUTION INTRAVENOUS CONTINUOUS PRN
Status: DISCONTINUED | OUTPATIENT
Start: 2018-11-26 | End: 2018-11-26

## 2018-11-26 RX ORDER — ONDANSETRON 4 MG/1
4 TABLET, ORALLY DISINTEGRATING ORAL EVERY 30 MIN PRN
Status: DISCONTINUED | OUTPATIENT
Start: 2018-11-26 | End: 2018-11-26

## 2018-11-26 RX ORDER — CEFAZOLIN SODIUM 1 G/3ML
1 INJECTION, POWDER, FOR SOLUTION INTRAMUSCULAR; INTRAVENOUS SEE ADMIN INSTRUCTIONS
Status: DISCONTINUED | OUTPATIENT
Start: 2018-11-26 | End: 2018-11-26 | Stop reason: HOSPADM

## 2018-11-26 RX ORDER — BUSPIRONE HYDROCHLORIDE 5 MG/1
5 TABLET ORAL EVERY MORNING
Status: DISCONTINUED | OUTPATIENT
Start: 2018-11-27 | End: 2018-12-03 | Stop reason: HOSPADM

## 2018-11-26 RX ORDER — ONDANSETRON 2 MG/ML
4 INJECTION INTRAMUSCULAR; INTRAVENOUS
Status: COMPLETED | OUTPATIENT
Start: 2018-11-26 | End: 2018-11-28

## 2018-11-26 RX ORDER — NALOXONE HYDROCHLORIDE 0.4 MG/ML
.1-.4 INJECTION, SOLUTION INTRAMUSCULAR; INTRAVENOUS; SUBCUTANEOUS
Status: DISCONTINUED | OUTPATIENT
Start: 2018-11-26 | End: 2018-11-26 | Stop reason: HOSPADM

## 2018-11-26 RX ORDER — AMOXICILLIN 250 MG
2 CAPSULE ORAL 2 TIMES DAILY
Status: DISCONTINUED | OUTPATIENT
Start: 2018-11-26 | End: 2018-12-03 | Stop reason: HOSPADM

## 2018-11-26 RX ORDER — EPHEDRINE SULFATE 50 MG/ML
INJECTION, SOLUTION INTRAMUSCULAR; INTRAVENOUS; SUBCUTANEOUS PRN
Status: DISCONTINUED | OUTPATIENT
Start: 2018-11-26 | End: 2018-11-26

## 2018-11-26 RX ORDER — PROCHLORPERAZINE MALEATE 5 MG
10 TABLET ORAL EVERY 6 HOURS PRN
Status: DISCONTINUED | OUTPATIENT
Start: 2018-11-26 | End: 2018-12-03 | Stop reason: HOSPADM

## 2018-11-26 RX ADMIN — ONDANSETRON HYDROCHLORIDE 4 MG: 2 INJECTION INTRAMUSCULAR; INTRAVENOUS at 21:02

## 2018-11-26 RX ADMIN — FENTANYL CITRATE 50 MCG: 50 INJECTION, SOLUTION INTRAMUSCULAR; INTRAVENOUS at 14:12

## 2018-11-26 RX ADMIN — Medication 0.5 MG: at 17:44

## 2018-11-26 RX ADMIN — ACETAMINOPHEN 975 MG: 325 TABLET, FILM COATED ORAL at 21:00

## 2018-11-26 RX ADMIN — SODIUM CHLORIDE, POTASSIUM CHLORIDE, SODIUM LACTATE AND CALCIUM CHLORIDE: 600; 310; 30; 20 INJECTION, SOLUTION INTRAVENOUS at 12:38

## 2018-11-26 RX ADMIN — FENTANYL CITRATE 50 MCG: 50 INJECTION, SOLUTION INTRAMUSCULAR; INTRAVENOUS at 12:20

## 2018-11-26 RX ADMIN — POTASSIUM CHLORIDE, DEXTROSE MONOHYDRATE AND SODIUM CHLORIDE: 150; 5; 450 INJECTION, SOLUTION INTRAVENOUS at 19:39

## 2018-11-26 RX ADMIN — PHENYLEPHRINE HYDROCHLORIDE 100 MCG: 10 INJECTION, SOLUTION INTRAMUSCULAR; INTRAVENOUS; SUBCUTANEOUS at 14:26

## 2018-11-26 RX ADMIN — POTASSIUM CHLORIDE, DEXTROSE MONOHYDRATE AND SODIUM CHLORIDE: 150; 5; 450 INJECTION, SOLUTION INTRAVENOUS at 17:24

## 2018-11-26 RX ADMIN — Medication 10 MG: at 14:28

## 2018-11-26 RX ADMIN — ROCURONIUM BROMIDE 100 MG: 10 INJECTION INTRAVENOUS at 13:01

## 2018-11-26 RX ADMIN — Medication 10 MG: at 14:29

## 2018-11-26 RX ADMIN — Medication: at 17:30

## 2018-11-26 RX ADMIN — FENTANYL CITRATE 50 MCG: 50 INJECTION, SOLUTION INTRAMUSCULAR; INTRAVENOUS at 17:10

## 2018-11-26 RX ADMIN — Medication 3 G: at 13:38

## 2018-11-26 RX ADMIN — FENTANYL CITRATE 100 MCG: 50 INJECTION, SOLUTION INTRAMUSCULAR; INTRAVENOUS at 12:59

## 2018-11-26 RX ADMIN — FENTANYL CITRATE 50 MCG: 50 INJECTION, SOLUTION INTRAMUSCULAR; INTRAVENOUS at 17:01

## 2018-11-26 RX ADMIN — PHENYLEPHRINE HYDROCHLORIDE 200 MCG: 10 INJECTION, SOLUTION INTRAMUSCULAR; INTRAVENOUS; SUBCUTANEOUS at 14:30

## 2018-11-26 RX ADMIN — CEFAZOLIN 1 G: 1 INJECTION, POWDER, FOR SOLUTION INTRAMUSCULAR; INTRAVENOUS at 15:35

## 2018-11-26 RX ADMIN — ACETAMINOPHEN 650 MG: 325 TABLET, FILM COATED ORAL at 12:04

## 2018-11-26 RX ADMIN — FENTANYL CITRATE 100 MCG: 50 INJECTION, SOLUTION INTRAMUSCULAR; INTRAVENOUS at 12:38

## 2018-11-26 RX ADMIN — BUPIVACAINE HYDROCHLORIDE 8 ML/HR: 7.5 INJECTION, SOLUTION EPIDURAL; RETROBULBAR at 15:30

## 2018-11-26 RX ADMIN — PROPOFOL 150 MG: 10 INJECTION, EMULSION INTRAVENOUS at 13:01

## 2018-11-26 RX ADMIN — PHENYLEPHRINE HYDROCHLORIDE 100 MCG: 10 INJECTION, SOLUTION INTRAMUSCULAR; INTRAVENOUS; SUBCUTANEOUS at 14:27

## 2018-11-26 RX ADMIN — SENNOSIDES AND DOCUSATE SODIUM 2 TABLET: 8.6; 5 TABLET ORAL at 21:00

## 2018-11-26 RX ADMIN — ROCURONIUM BROMIDE 10 MG: 10 INJECTION INTRAVENOUS at 14:56

## 2018-11-26 RX ADMIN — GABAPENTIN 300 MG: 300 CAPSULE ORAL at 12:05

## 2018-11-26 RX ADMIN — FENTANYL CITRATE 100 MCG: 50 INJECTION, SOLUTION INTRAMUSCULAR; INTRAVENOUS at 16:23

## 2018-11-26 RX ADMIN — Medication 0.5 MG: at 18:03

## 2018-11-26 RX ADMIN — GLYCOPYRROLATE 0.2 MG: 0.2 INJECTION, SOLUTION INTRAMUSCULAR; INTRAVENOUS at 13:54

## 2018-11-26 RX ADMIN — LIDOCAINE HYDROCHLORIDE 80 MG: 20 INJECTION, SOLUTION INFILTRATION; PERINEURAL at 12:59

## 2018-11-26 RX ADMIN — Medication 0.5 MG: at 17:25

## 2018-11-26 RX ADMIN — SUGAMMADEX 200 MG: 100 INJECTION, SOLUTION INTRAVENOUS at 16:20

## 2018-11-26 RX ADMIN — FENTANYL CITRATE 50 MCG: 50 INJECTION, SOLUTION INTRAMUSCULAR; INTRAVENOUS at 13:49

## 2018-11-26 RX ADMIN — MIDAZOLAM 2 MG: 1 INJECTION INTRAMUSCULAR; INTRAVENOUS at 12:20

## 2018-11-26 RX ADMIN — SERTRALINE HYDROCHLORIDE 50 MG: 25 TABLET ORAL at 21:00

## 2018-11-26 ASSESSMENT — PAIN DESCRIPTION - DESCRIPTORS: DESCRIPTORS: SHARP

## 2018-11-26 ASSESSMENT — ACTIVITIES OF DAILY LIVING (ADL): ADLS_ACUITY_SCORE: 9

## 2018-11-26 NOTE — ANESTHESIA PROCEDURE NOTES
Epidural Procedure Note    Staff:     Anesthesiologist:  YAIR JOAQUIN    Resident/CRNA:  LISA CLOUD    Procedure performed by resident/CRNA in the presence of a teaching physician    Location: Pre-op     Procedure start time:  11/26/2018 12:16 PM     Procedure end time:  11/26/2018 12:32 PM   Pre-procedure checklist:   patient identified, IV checked, site marked, risks and benefits discussed, informed consent, monitors and equipment checked, pre-op evaluation, at physician/surgeon's request and post-op pain management      Correct Patient: Yes      Correct Position: Yes      Correct Site: Yes      Correct Procedure: Yes      Correct Laterality:  Yes    Site Marked:  Yes  Procedure:     Procedure:  Epidural catheter    ASA:  2    Position:  Sitting    Sterile Prep: chloraprep, mask, sterile gloves and patient draped      Insertion site:  T5-6    Local skin infiltration:  1% lidocaine    Approach:  Midline    Needle gauge (G):  17    Needle Length (in):  5    Block Needle Type:  Touhy    Injection Technique:  LORT saline    NELSON at (cm):  8    Attempts:  1    Redirects:  0    Catheter gauge (G):  19    Catheter threaded easily: Yes      Threaded to cm at skin:  12    Threaded in epidural space (cm):  4    Paresthesias:  No    Aspiration negative for Heme or CSF: Yes      Test dose (mL):  3     Local anesthetic:  Lidocaine 1.5% w/ 1:200,000 epinephrine    Test dose time:  12:29    Test dose negative for signs of intravascular, subdural or intrathecal injection: Yes
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
## 2018-11-26 NOTE — BRIEF OP NOTE
[facility], [place]    Brief Operative Note    Pre-operative diagnosis: Paralysis of Diapharam  Post-operative diagnosis Same  Procedure: Procedure(s):  Right Thoracotomy with Diaphramic Plication     Surgeon: Surgeon(s) and Role:     * Winston Yeh MD - Primary     * Francy Rueda MD - Assisting    Anesthesia: General   Estimated blood loss: Less than 100 ml  Drains: Right angled chest tube    Specimens: * No specimens in log *  Findings:   Right diaphragm paralysis.  Complications: None.  Implants: None.

## 2018-11-26 NOTE — OR NURSING
Epidural catheter placed at bedside. Pt. Tolerated procedure well. Pt denies tinnitus, metallic taste, circumoral numbness.

## 2018-11-26 NOTE — ANESTHESIA PROCEDURE NOTES
Arterial Line Procedure Note  Staff:     Anesthesiologist:  TOÑITO STOREY    Resident/CRNA:  ELTON TITUS    Arterial line performed by resident/CRNA in presence of a teaching physician    Location: In OR After Induction  Procedure Start/Stop Times:     patient identified, IV checked, site marked, risks and benefits discussed, informed consent, monitors and equipment checked, pre-op evaluation and at physician/surgeon's request      Correct Patient: Yes      Correct Position: Yes      Correct Site: Yes      Correct Procedure: Yes      Correct Laterality:  Yes    Site Marked:  Yes  Line Placement:     Procedure:  Arterial Line    Insertion Site:  Radial    Insertion laterality:  Left    Skin Prep: Chloraprep      Patient Prep: patient draped, mask, sterile gloves, hat and hand hygiene      Local skin infiltration:  None    Ultrasound Guided?: No      Catheter size:  20 gauge, 12 cm    Cath secured with: suture      Dressing:  Tegaderm    Complications:  None obvious    Arterial waveform: Yes      IBP within 10% of NIBP: Yes

## 2018-11-26 NOTE — ANESTHESIA CARE TRANSFER NOTE
Patient: Hal Clark    Procedure(s):  Right Thoracotomy with Diaphramic Plication    Diagnosis: Paralysis of Diapharam  Diagnosis Additional Information: No value filed.    Anesthesia Type:   No value filed.     Note:  Airway :Face Mask  Patient transferred to:PACU  Comments: Spont resp, VSS, report to RNHandoff Report: Identifed the Patient, Identified the Reponsible Provider, Reviewed the pertinent medical history, Discussed the surgical course, Reviewed Intra-OP anesthesia mangement and issues during anesthesia, Set expectations for post-procedure period and Allowed opportunity for questions and acknowledgement of understanding      Vitals: (Last set prior to Anesthesia Care Transfer)    CRNA VITALS  11/26/2018 1608 - 11/26/2018 1644      11/26/2018             Pulse: 74    ART BP: 111/72    ART Mean: 86    SpO2: 100 %    Resp Rate (observed): 19                Electronically Signed By: EMILY Calhoun CRNA  November 26, 2018  4:44 PM

## 2018-11-27 ENCOUNTER — APPOINTMENT (OUTPATIENT)
Dept: GENERAL RADIOLOGY | Facility: CLINIC | Age: 48
DRG: 163 | End: 2018-11-27
Attending: THORACIC SURGERY (CARDIOTHORACIC VASCULAR SURGERY)
Payer: COMMERCIAL

## 2018-11-27 ENCOUNTER — APPOINTMENT (OUTPATIENT)
Dept: OCCUPATIONAL THERAPY | Facility: CLINIC | Age: 48
DRG: 163 | End: 2018-11-27
Attending: THORACIC SURGERY (CARDIOTHORACIC VASCULAR SURGERY)
Payer: COMMERCIAL

## 2018-11-27 ENCOUNTER — APPOINTMENT (OUTPATIENT)
Dept: ULTRASOUND IMAGING | Facility: CLINIC | Age: 48
DRG: 163 | End: 2018-11-27
Attending: THORACIC SURGERY (CARDIOTHORACIC VASCULAR SURGERY)
Payer: COMMERCIAL

## 2018-11-27 LAB
ALBUMIN SERPL-MCNC: 2.7 G/DL (ref 3.4–5)
ALBUMIN UR-MCNC: 100 MG/DL
ALP SERPL-CCNC: 74 U/L (ref 40–150)
ALT SERPL W P-5'-P-CCNC: 19 U/L (ref 0–70)
ANION GAP SERPL CALCULATED.3IONS-SCNC: 10 MMOL/L (ref 3–14)
ANION GAP SERPL CALCULATED.3IONS-SCNC: 8 MMOL/L (ref 3–14)
APPEARANCE UR: ABNORMAL
AST SERPL W P-5'-P-CCNC: 24 U/L (ref 0–45)
BACTERIA #/AREA URNS HPF: ABNORMAL /HPF
BILIRUB SERPL-MCNC: 0.9 MG/DL (ref 0.2–1.3)
BILIRUB UR QL STRIP: NEGATIVE
BUN SERPL-MCNC: 11 MG/DL (ref 7–30)
BUN SERPL-MCNC: 14 MG/DL (ref 7–30)
CALCIUM SERPL-MCNC: 8 MG/DL (ref 8.5–10.1)
CALCIUM SERPL-MCNC: 8.7 MG/DL (ref 8.5–10.1)
CHLORIDE SERPL-SCNC: 102 MMOL/L (ref 94–109)
CHLORIDE SERPL-SCNC: 102 MMOL/L (ref 94–109)
CK SERPL-CCNC: 525 U/L (ref 30–300)
CO2 SERPL-SCNC: 22 MMOL/L (ref 20–32)
CO2 SERPL-SCNC: 23 MMOL/L (ref 20–32)
COLOR UR AUTO: ABNORMAL
CREAT SERPL-MCNC: 0.85 MG/DL (ref 0.66–1.25)
CREAT SERPL-MCNC: 1.86 MG/DL (ref 0.66–1.25)
CREAT SERPL-MCNC: 2.15 MG/DL (ref 0.66–1.25)
CREAT UR-MCNC: 310 MG/DL
ERYTHROCYTE [DISTWIDTH] IN BLOOD BY AUTOMATED COUNT: 14.4 % (ref 10–15)
ERYTHROCYTE [DISTWIDTH] IN BLOOD BY AUTOMATED COUNT: 14.5 % (ref 10–15)
FRACT EXCRET NA UR+SERPL-RTO: 0.2 %
GFR SERPL CREATININE-BSD FRML MDRD: 33 ML/MIN/1.7M2
GFR SERPL CREATININE-BSD FRML MDRD: 39 ML/MIN/1.7M2
GFR SERPL CREATININE-BSD FRML MDRD: >90 ML/MIN/1.7M2
GLUCOSE SERPL-MCNC: 103 MG/DL (ref 70–99)
GLUCOSE SERPL-MCNC: 146 MG/DL (ref 70–99)
GLUCOSE UR STRIP-MCNC: 30 MG/DL
HCT VFR BLD AUTO: 34.2 % (ref 40–53)
HCT VFR BLD AUTO: 39.4 % (ref 40–53)
HGB BLD-MCNC: 11.2 G/DL (ref 13.3–17.7)
HGB BLD-MCNC: 13.2 G/DL (ref 13.3–17.7)
HGB UR QL STRIP: ABNORMAL
KETONES UR STRIP-MCNC: 10 MG/DL
LDH SERPL L TO P-CCNC: 137 U/L (ref 85–227)
LEUKOCYTE ESTERASE UR QL STRIP: ABNORMAL
MCH RBC QN AUTO: 32.6 PG (ref 26.5–33)
MCH RBC QN AUTO: 33.1 PG (ref 26.5–33)
MCHC RBC AUTO-ENTMCNC: 32.7 G/DL (ref 31.5–36.5)
MCHC RBC AUTO-ENTMCNC: 33.5 G/DL (ref 31.5–36.5)
MCV RBC AUTO: 99 FL (ref 78–100)
MCV RBC AUTO: 99 FL (ref 78–100)
MUCOUS THREADS #/AREA URNS LPF: PRESENT /LPF
NITRATE UR QL: NEGATIVE
OSMOLALITY SERPL: 275 MMOL/KG (ref 275–295)
OSMOLALITY UR: 513 MMOL/KG (ref 100–1200)
PH UR STRIP: 5.5 PH (ref 5–7)
PLATELET # BLD AUTO: 207 10E9/L (ref 150–450)
PLATELET # BLD AUTO: 275 10E9/L (ref 150–450)
POTASSIUM SERPL-SCNC: 4.4 MMOL/L (ref 3.4–5.3)
POTASSIUM SERPL-SCNC: 4.5 MMOL/L (ref 3.4–5.3)
PROT SERPL-MCNC: 7.2 G/DL (ref 6.8–8.8)
RADIOLOGIST FLAGS: ABNORMAL
RBC # BLD AUTO: 3.44 10E12/L (ref 4.4–5.9)
RBC # BLD AUTO: 3.99 10E12/L (ref 4.4–5.9)
RBC #/AREA URNS AUTO: >182 /HPF (ref 0–2)
SODIUM SERPL-SCNC: 132 MMOL/L (ref 133–144)
SODIUM SERPL-SCNC: 134 MMOL/L (ref 133–144)
SODIUM UR-SCNC: 36 MMOL/L
SOURCE: ABNORMAL
SP GR UR STRIP: 1.03 (ref 1–1.03)
TRANS CELLS #/AREA URNS HPF: 1 /HPF (ref 0–1)
UROBILINOGEN UR STRIP-MCNC: NORMAL MG/DL (ref 0–2)
WBC # BLD AUTO: 15.8 10E9/L (ref 4–11)
WBC # BLD AUTO: 17.9 10E9/L (ref 4–11)
WBC #/AREA URNS AUTO: 124 /HPF (ref 0–5)
WBC CLUMPS #/AREA URNS HPF: PRESENT /HPF

## 2018-11-27 PROCEDURE — 25000128 H RX IP 250 OP 636: Performed by: STUDENT IN AN ORGANIZED HEALTH CARE EDUCATION/TRAINING PROGRAM

## 2018-11-27 PROCEDURE — 82570 ASSAY OF URINE CREATININE: CPT | Performed by: THORACIC SURGERY (CARDIOTHORACIC VASCULAR SURGERY)

## 2018-11-27 PROCEDURE — 82550 ASSAY OF CK (CPK): CPT | Performed by: THORACIC SURGERY (CARDIOTHORACIC VASCULAR SURGERY)

## 2018-11-27 PROCEDURE — 83930 ASSAY OF BLOOD OSMOLALITY: CPT | Performed by: SURGERY

## 2018-11-27 PROCEDURE — 81001 URINALYSIS AUTO W/SCOPE: CPT | Performed by: THORACIC SURGERY (CARDIOTHORACIC VASCULAR SURGERY)

## 2018-11-27 PROCEDURE — 25000132 ZZH RX MED GY IP 250 OP 250 PS 637: Performed by: SURGERY

## 2018-11-27 PROCEDURE — 40000141 ZZH STATISTIC PERIPHERAL IV START W/O US GUIDANCE

## 2018-11-27 PROCEDURE — 97535 SELF CARE MNGMENT TRAINING: CPT | Mod: GO

## 2018-11-27 PROCEDURE — 87086 URINE CULTURE/COLONY COUNT: CPT | Performed by: THORACIC SURGERY (CARDIOTHORACIC VASCULAR SURGERY)

## 2018-11-27 PROCEDURE — 85027 COMPLETE CBC AUTOMATED: CPT | Performed by: THORACIC SURGERY (CARDIOTHORACIC VASCULAR SURGERY)

## 2018-11-27 PROCEDURE — 85027 COMPLETE CBC AUTOMATED: CPT | Performed by: SURGERY

## 2018-11-27 PROCEDURE — 97165 OT EVAL LOW COMPLEX 30 MIN: CPT | Mod: GO

## 2018-11-27 PROCEDURE — 71046 X-RAY EXAM CHEST 2 VIEWS: CPT

## 2018-11-27 PROCEDURE — 25800025 ZZH RX 258: Performed by: SURGERY

## 2018-11-27 PROCEDURE — 94660 CPAP INITIATION&MGMT: CPT

## 2018-11-27 PROCEDURE — 36415 COLL VENOUS BLD VENIPUNCTURE: CPT | Performed by: THORACIC SURGERY (CARDIOTHORACIC VASCULAR SURGERY)

## 2018-11-27 PROCEDURE — 83615 LACTATE (LD) (LDH) ENZYME: CPT | Performed by: THORACIC SURGERY (CARDIOTHORACIC VASCULAR SURGERY)

## 2018-11-27 PROCEDURE — 76770 US EXAM ABDO BACK WALL COMP: CPT

## 2018-11-27 PROCEDURE — 25000128 H RX IP 250 OP 636: Performed by: SURGERY

## 2018-11-27 PROCEDURE — 40000275 ZZH STATISTIC RCP TIME EA 10 MIN

## 2018-11-27 PROCEDURE — 40000133 ZZH STATISTIC OT WARD VISIT

## 2018-11-27 PROCEDURE — 40000802 ZZH SITE CHECK

## 2018-11-27 PROCEDURE — 80048 BASIC METABOLIC PNL TOTAL CA: CPT | Performed by: THORACIC SURGERY (CARDIOTHORACIC VASCULAR SURGERY)

## 2018-11-27 PROCEDURE — 84300 ASSAY OF URINE SODIUM: CPT | Performed by: THORACIC SURGERY (CARDIOTHORACIC VASCULAR SURGERY)

## 2018-11-27 PROCEDURE — 25000132 ZZH RX MED GY IP 250 OP 250 PS 637: Performed by: THORACIC SURGERY (CARDIOTHORACIC VASCULAR SURGERY)

## 2018-11-27 PROCEDURE — 36415 COLL VENOUS BLD VENIPUNCTURE: CPT | Performed by: SURGERY

## 2018-11-27 PROCEDURE — 25000128 H RX IP 250 OP 636: Performed by: THORACIC SURGERY (CARDIOTHORACIC VASCULAR SURGERY)

## 2018-11-27 PROCEDURE — 12000006 ZZH R&B IMCU INTERMEDIATE UMMC

## 2018-11-27 PROCEDURE — 83935 ASSAY OF URINE OSMOLALITY: CPT | Performed by: SURGERY

## 2018-11-27 PROCEDURE — 80053 COMPREHEN METABOLIC PANEL: CPT | Performed by: SURGERY

## 2018-11-27 RX ORDER — FUROSEMIDE 10 MG/ML
40 INJECTION INTRAMUSCULAR; INTRAVENOUS ONCE
Status: DISCONTINUED | OUTPATIENT
Start: 2018-11-27 | End: 2018-12-03 | Stop reason: HOSPADM

## 2018-11-27 RX ORDER — HEPARIN SODIUM 10000 [USP'U]/ML
7500 INJECTION, SOLUTION INTRAVENOUS; SUBCUTANEOUS EVERY 8 HOURS SCHEDULED
Status: DISCONTINUED | OUTPATIENT
Start: 2018-11-27 | End: 2018-12-01

## 2018-11-27 RX ORDER — SODIUM CHLORIDE 9 MG/ML
INJECTION, SOLUTION INTRAVENOUS CONTINUOUS
Status: DISCONTINUED | OUTPATIENT
Start: 2018-11-27 | End: 2018-11-28

## 2018-11-27 RX ORDER — SODIUM CHLORIDE, SODIUM LACTATE, POTASSIUM CHLORIDE, CALCIUM CHLORIDE 600; 310; 30; 20 MG/100ML; MG/100ML; MG/100ML; MG/100ML
INJECTION, SOLUTION INTRAVENOUS CONTINUOUS
Status: DISCONTINUED | OUTPATIENT
Start: 2018-11-27 | End: 2018-11-27

## 2018-11-27 RX ORDER — POLYETHYLENE GLYCOL 3350 17 G/17G
17 POWDER, FOR SOLUTION ORAL 2 TIMES DAILY
Status: DISCONTINUED | OUTPATIENT
Start: 2018-11-27 | End: 2018-12-03 | Stop reason: HOSPADM

## 2018-11-27 RX ADMIN — FLUTICASONE PROPIONATE 2 SPRAY: 50 SPRAY, METERED NASAL at 08:21

## 2018-11-27 RX ADMIN — ONDANSETRON HYDROCHLORIDE 4 MG: 2 INJECTION INTRAMUSCULAR; INTRAVENOUS at 21:21

## 2018-11-27 RX ADMIN — SODIUM CHLORIDE: 9 INJECTION, SOLUTION INTRAVENOUS at 09:30

## 2018-11-27 RX ADMIN — POLYETHYLENE GLYCOL 3350 17 G: 17 POWDER, FOR SOLUTION ORAL at 08:22

## 2018-11-27 RX ADMIN — POTASSIUM CHLORIDE, DEXTROSE MONOHYDRATE AND SODIUM CHLORIDE: 150; 5; 450 INJECTION, SOLUTION INTRAVENOUS at 02:47

## 2018-11-27 RX ADMIN — AMLODIPINE BESYLATE 5 MG: 5 TABLET ORAL at 08:21

## 2018-11-27 RX ADMIN — ENOXAPARIN SODIUM 40 MG: 40 INJECTION SUBCUTANEOUS at 08:21

## 2018-11-27 RX ADMIN — SODIUM CHLORIDE 500 ML: 9 INJECTION, SOLUTION INTRAVENOUS at 09:45

## 2018-11-27 RX ADMIN — SODIUM CHLORIDE: 9 INJECTION, SOLUTION INTRAVENOUS at 19:36

## 2018-11-27 RX ADMIN — Medication 7500 UNITS: at 15:21

## 2018-11-27 RX ADMIN — ACETAMINOPHEN 975 MG: 325 TABLET, FILM COATED ORAL at 11:38

## 2018-11-27 RX ADMIN — Medication 7500 UNITS: at 21:21

## 2018-11-27 RX ADMIN — BUSPIRONE HYDROCHLORIDE 5 MG: 5 TABLET ORAL at 08:21

## 2018-11-27 RX ADMIN — SENNOSIDES AND DOCUSATE SODIUM 2 TABLET: 8.6; 5 TABLET ORAL at 19:35

## 2018-11-27 RX ADMIN — SENNOSIDES AND DOCUSATE SODIUM 2 TABLET: 8.6; 5 TABLET ORAL at 08:20

## 2018-11-27 RX ADMIN — PROPRANOLOL HYDROCHLORIDE 80 MG: 80 CAPSULE, EXTENDED RELEASE ORAL at 08:21

## 2018-11-27 RX ADMIN — ACETAMINOPHEN 975 MG: 325 TABLET, FILM COATED ORAL at 19:35

## 2018-11-27 RX ADMIN — ACETAMINOPHEN 975 MG: 325 TABLET, FILM COATED ORAL at 02:46

## 2018-11-27 RX ADMIN — BUPIVACAINE HYDROCHLORIDE: 7.5 INJECTION, SOLUTION EPIDURAL; RETROBULBAR at 15:28

## 2018-11-27 RX ADMIN — SERTRALINE HYDROCHLORIDE 50 MG: 25 TABLET ORAL at 08:21

## 2018-11-27 RX ADMIN — POTASSIUM CHLORIDE, DEXTROSE MONOHYDRATE AND SODIUM CHLORIDE: 150; 5; 450 INJECTION, SOLUTION INTRAVENOUS at 08:49

## 2018-11-27 RX ADMIN — ONDANSETRON HYDROCHLORIDE 4 MG: 2 INJECTION INTRAMUSCULAR; INTRAVENOUS at 04:53

## 2018-11-27 RX ADMIN — SODIUM CHLORIDE, POTASSIUM CHLORIDE, SODIUM LACTATE AND CALCIUM CHLORIDE 1000 ML: 600; 310; 30; 20 INJECTION, SOLUTION INTRAVENOUS at 06:54

## 2018-11-27 RX ADMIN — ONDANSETRON HYDROCHLORIDE 4 MG: 2 INJECTION INTRAMUSCULAR; INTRAVENOUS at 15:20

## 2018-11-27 ASSESSMENT — ACTIVITIES OF DAILY LIVING (ADL)
ADLS_ACUITY_SCORE: 10
ADLS_ACUITY_SCORE: 11
ADLS_ACUITY_SCORE: 9
ADLS_ACUITY_SCORE: 11

## 2018-11-27 ASSESSMENT — PAIN DESCRIPTION - DESCRIPTORS
DESCRIPTORS: SHARP;TIGHTNESS
DESCRIPTORS: TIGHTNESS

## 2018-11-27 NOTE — PLAN OF CARE
Neuro: A&Ox4.   Cardiac: SR/ST. VSS.   Respiratory: Sating >95% on 2 L NC. Home CPAP at HS. Encourage IS Q 1 hr  GI/: Diaz, low UOP, oliguric, 1L bolus x 2, UA and urine osmo sent. Renal US completed. Neph consult.   Diet/appetite: Tolerating clears diet.  Activity:  Assist of 1, up to chair and in halls.  Pain: At acceptable level on current regimen. Dilaudid PCA and epidural   Skin: No new deficits noted.  LDA's: chest tube to -20 suction. Diaz to remain in for strict I&O - flushed (x2) and bladder scanned (x2) - diaz patent     Plan: Continue with POC. Notify primary team with changes.

## 2018-11-27 NOTE — ANESTHESIA POSTPROCEDURE EVALUATION
Anesthesia POST Procedure Evaluation    Patient: Hal Clark   MRN:     [id number] Gender:   male   Age:    47 year old :      1970        Preoperative Diagnosis: Paralysis of Diapharam   Procedure(s):  Right Thoracotomy with Diaphramic Plication   Postop Comments: No value filed.       Anesthesia Type:  General    Reportable Event: NO     PAIN: Uncomplicated   Sign Out status: Comfortable, Well controlled pain     PONV: No PONV   Sign Out status:  No Nausea or Vomiting     Neuro/Psych: Uneventful perioperative course   Sign Out Status: Preoperative baseline; Age appropriate mentation     Airway/Resp.: Uneventful perioperative course   Sign Out Status: Non labored breathing, age appropriate RR; Resp. Status within EXPECTED Parameters     CV: Uneventful perioperative course   Sign Out status: Appropriate BP and perfusion indices; Appropriate HR/Rhythm     Disposition:   Sign Out in:  PACU  Recovery Course: Uneventful  Follow-Up: Not required           Last Anesthesia Record Vitals:  CRNA VITALS  2018 1608 - 2018 1708      2018             NIBP: (!)  113/92    Pulse: 78    ART BP: 132/87    Temp: 36.7  C (98.1  F)    SpO2: 99 %    Resp Rate (observed): 14    EKG: Sinus rhythm          Last PACU/Preop Vitals:  Vitals:    18   BP: (!) 118/95 110/88 (!) 175/96   Resp: 22     Temp:      SpO2: 98% 90% 97%         Electronically Signed By: Nik Herrera MD, 2018, 9:28 PM

## 2018-11-27 NOTE — PLAN OF CARE
Problem: Patient Care Overview  Goal: Plan of Care/Patient Progress Review  Discharge Planner OT   Patient plan for discharge: difficult to discuss due to cognitive impairment   Current status: OT evaluation completed. Pt poor historian and difficult to obtain living environment and PLOF, no family present. Pt with hx of TBI and impaired attention, difficult to assess if cognition is worsened since surgery. Pt mod I for bed mobility and CGA for functional transfers. Pt ambulated hallway with CGA-min A and unilateral support on IV pole. Pt desats to 83% on RA needing seated rest break and continuous cues for PLB to maintain sats >93%.   Barriers to return to prior living situation: post op precautions, cognitive impairment, balance deficits, deconditioning  Recommendations for discharge: TCU vs home   Rationale for recommendations: Difficult to assess PLOF due to pt's cognitive impairments from TBI. Anticipate if pt has adequate assist he will be progress towards home with assist however pt poor historian. Will continue to assess and update recommendations as able.        Entered by: Jacquie Victoria 11/27/2018 3:31 PM
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
## 2018-11-27 NOTE — PLAN OF CARE
Problem: Patient Care Overview  Goal: Plan of Care/Patient Progress Review  Neuro: A&Ox4. Post -op  Cardiac: SR. VSS.   Respiratory: Sating 2 LPM by NC .   GI/: diaz patent and draining yellow clear urine.   Diet/appetite: NPO. Denied nausea or vomiting.   Activity:  Assist of 2 to turn and reposition on bed.   Pain: At acceptable level on current regimen.   Skin: No new deficits noted.  LDA's: PIV , diaz and CT patent and draining well.   Plan: Continue with POC. Notify primary team with changes.

## 2018-11-27 NOTE — PROGRESS NOTES
"REGIONAL ANESTHESIA PAIN SERVICE EPIDURAL NOTE  Hal Clark is a 47 year old male POD #1 s/p THORACOSCOPIC DIAPHRAMIC PLICATION  THORACOTOMY and placement of T5-6 epidural catheter for pain management.      SUBJECTIVE  Interval History: Overnight no acute events. Patient jumps from one thought to another during interview, reports \"feels like I had surgery, but no pain\", states his pain is well controlled with epidural infusion, Tylenol scheduled and Dilaudid PC 0.2 mg doses, used 3.8 mg/past 8 hrs (see below).  Denies weakness, paresthesias, circumoral numbness, metallic taste or tinnitus.  Patient able to move upper and lower extremities in bed, reposition self to sitting upright in bed with minimal assist.  Currently tolerating a clear liquid diet, intermittent nausea, states he has history of nausea, GERD symptoms and current symptoms are not bothersome.  Urethral catheter in place, \"feels like I have to pee.\"     Clinically Aligned Pain Assessment (CAPA):  Comfort (How is your pain?): Comfortably manageable  Change in Pain (Since your last medication/intervention?): About the same  Pain Control (How are your pain treatments working?):  Fully effective pain control  Functioning (Are you able to do activities to get better?) : Can do everything I need to do      Antithrombotic/Thrombolytic Therapy ordered:    enoxaparin (LOVENOX) injection 40 mg 40 mg, SC, Q24H         Analgesic Medications:  Medications related to Pain Management (Future)    Start     Dose/Rate Route Frequency Ordered Stop    11/27/18 0800  busPIRone (BUSPAR) tablet 5 mg      5 mg Oral EVERY MORNING 11/26/18 1932 11/27/18 0800  polyethylene glycol (MIRALAX/GLYCOLAX) Packet 17 g      17 g Oral 2 TIMES DAILY 11/27/18 0002      11/26/18 2000  senna-docusate (SENOKOT-S;PERICOLACE) 8.6-50 MG per tablet 1 tablet      1 tablet Oral 2 TIMES DAILY 11/26/18 1933 11/26/18 2000  senna-docusate (SENOKOT-S;PERICOLACE) 8.6-50 MG per tablet 2 tablet   " "   2 tablet Oral 2 TIMES DAILY 11/26/18 1933      11/26/18 1945  acetaminophen (TYLENOL) tablet 975 mg      975 mg Oral EVERY 8 HOURS 11/26/18 1932 11/26/18 1932  lidocaine 1 % 1 mL      1 mL Other EVERY 1 HOUR PRN 11/26/18 1932 11/26/18 1932  lidocaine (LMX4) cream       Topical EVERY 1 HOUR PRN 11/26/18 1932 11/26/18 1932  cyclobenzaprine (FLEXERIL) tablet 10 mg      10 mg Oral 2 TIMES DAILY PRN 11/26/18 1932 11/26/18 1932  bupivacaine liposome (EXPAREL) LONG ACTING injection was administered into the infiltration site to produce postsurgical analgesia. Duration of action is up to 72 hours, and other \"massiel\" medications should not be given for 96 hours with the exception of the lidocaine 5% patch (LIDODERM) and the lidocaine 10mg in potassium infusions. This entry is for INFORMATION ONLY.       Does not apply CONTINUOUS PRN 11/26/18 1932 11/30/18 1931 11/26/18 1730  HYDROmorphone (DILAUDID) PCA 1 mg/mL OPIOID NAIVE       Intravenous CONTINUOUS 11/26/18 1716      11/26/18 1300  bupivacaine (MARCAINE) 0.125 % in sodium chloride 0.9 % 250 mL EPIDURAL Infusion      10 mL/hr  EPIDURAL CONTINUOUS 11/26/18 1257      11/26/18 1257  nalbuphine (NUBAIN) injection 2.5-5 mg      2.5-5 mg Intravenous EVERY 6 HOURS PRN 11/26/18 1257             OBJECTIVE  Lab Results:   Recent Labs   Lab Test  11/27/18   0452   WBC  17.9*   RBC  3.99*   HGB  13.2*   HCT  39.4*   MCV  99   MCH  33.1*   MCHC  33.5   RDW  14.5   PLT  275       Lab Results   Component Value Date    INR 0.99 11/20/2018       Vitals:    Temp:  [97.6  F (36.4  C)-99.2  F (37.3  C)] 99.2  F (37.3  C)  Heart Rate:  [] 97  Resp:  [12-22] 20  BP: ()/() 106/89  MAP:  [96 mmHg-103 mmHg] 101 mmHg  Arterial Line BP: (109-129)/(82-87) 123/83  SpO2:  [90 %-100 %] 97 %  /89 (BP Location: Left arm)  Temp 99.2  F (37.3  C) (Axillary)  Resp 20  Ht 1.778 m (5' 10\")  Wt 126.3 kg (278 lb 7.1 oz)  SpO2 97%  BMI 39.95 kg/m2   "     Exam:   GEN: alert and no distress  NEURO/MSK: Strength BLE 5/5  and overall symmetric  SKIN: Epidural catheter site with dressing c/d/i, no tenderness, erythema, heme, edema     ASSESSMENT/PLAN:    Patient is receiving adequate analgesia with current multimodal therapy including T5-6 epidural catheter infusion of bupivacaine 0.125%  at 10 mL/hr.  Motor function intact and adequate sensory block, is meeting activity goals.  No evidence of adverse side effects related to local anesthetic. Low dark urine output via diaz, going to receive IV fluid bolus.    POD#1  - continue current epidural infusion Bupivacaine 0.125% at 10 mL/hour.  Plan to continue up to POD #5  - antithrombotic/thrombolytic therapy okay to continue enoxaparin Q 24 hours as ordered. Please contact RAPS (#[phone]) prior to any medication changes  - will continue to follow and adjust as needed    - discussed plan with attending anesthesiologist    EMILY Diaz [facility]  Regional Anesthesia Pain Service  11/27/2018 7:49 AM    RAPS Contact Info (24 hour job code pager is the last 4 digits) For in-house use only:   [place] phone: [place] [phone], [place] [phone], Peds [phone], then enter call-back number.    Text: Use [employer] on the Intranet <Paging/Directory> tab and enter Jobcode ID.   If no call back at any time, contact the hospital  and ask for RAPS attending or backup

## 2018-11-27 NOTE — OP NOTE
Procedure Date: 11/26/2018      PREOPERATIVE DIAGNOSIS:  Right diaphragm paralysis.      POSTOPERATIVE DIAGNOSIS:  Right diaphragm paralysis.      PROCEDURE PERFORMED:   1.  Flexible bronchoscopy.   2.  Right posterolateral thoracotomy.   3.  Right diaphragm plication.   4.  Extensive pneumolysis, more than 30 minutes.      ANESTHESIA:  General endotracheal anesthesia with a double-lumen endotracheal tube.      SURGEON:  Winston Colon MD.      ASSISTANT:  Francy Crooks MD, Cardiothoracic Surgery fellow.      COMPLICATIONS:  None.      ESTIMATED BLOOD LOSS:  50 mL.        BLOOD PRODUCTS:  None.      IMPLANTS:  None.      DRAINS:  A 28-Wallisian angled [place] tube to posterior to close the diaphragmatic recess.      SPECIMENS:  None.      OPERATIVE FINDINGS:  A right posterolateral thoracotomy was performed.  The latissimus dorsi was divided.  The serratus was preserved.  We entered in the fifth intercostal space and shingled the sixth rib.  The patient had a paralyzed diaphragm with the apex of the diaphragm at the level of the azygos vein.  Six rows of Tycron stitches using pledgets were used to plicate the diaphragm from posterior to anterior.  After the diaphragm was plicated, the apex of the diaphragm was below the level of the inferior pulmonary vein.      DESCRIPTION OF PROCEDURE IN DETAIL:  The patient was taken to the operating room, placed in a supine position.  All pressure points were adequately padded.  General endotracheal anesthesia was induced.  We placed a double-lumen endotracheal tube for single lung ventilation and placement was confirmed with bronchoscopy.  The patient was then switched to a left lateral decubitus position with the right side up.  The right diaphragm was prepped with ChloraPrep and draped in normal sterile fashion.  An iodine impregnated plastic adhesive dressing was used.  A timeout was performed confirming the name of the patient and correct procedure.  He had SCDs in  place and functioning prior to induction of anesthesia.  He received antibiotics within 30 minutes of incision.      After the timeout was completed, we started by performing a right posterolateral thoracotomy incision.  We divided the latissimus dorsi and the serratus was preserved.  We entered the fifth intercostal space.  The sixth rib was shingled.  A Vidya retractor was placed.  We had excellent exposure.  The patient had a moderate amount of pulmonary adhesions from the right upper lobe to the patient's chest wall.  These were taken down with electrocautery.  The inferior pulmonary ligament was then divided.  The apex of the right diaphragm appeared to be at the level of the azygos vein.  We then placed a horizontal mattress stitch using a #1 Tycron pledgeted.  The stitches were placed from posterior to anterior.  We placed 6 rows.  Every time a stitch was placed, an Allis clamp was used to pinch the diaphragm and avoid injuring the underlying viscera.  The 6 rows were placed and tied sequentially.  After all rows were placed, we achieved an excellent plication.  The patient's hemodynamics were unchanged, confirming adequate preload from the inferior vena cava.  The apex of the plicated diaphragm was below the level of the inferior pulmonary vein.  Lastly, we reexpanded the right lung to confirm adequate lung inflation and expansion of the right lower lobe.  The entire lung appeared to fill the thoracic cavity without any problems.  Next, a 28-H[place]ian angled [place] tube was placed in the posterior costal diaphragmatic recess.  We confirmed hemostasis.  Three pericostal stitches were placed using #1 Ethibond to approximate the ribs.  These were placed in a figure-of-eight fashion for extra reinforcement.  The wound was then closed in multiple layers using absorbable suture, using 0 Vicryl.  The skin was closed with 3-0 Vicryl in a running fashion.  Dermabond was applied.  The patient tolerated the procedure  well.      All instrument and sponge counts were correct at the end of the case.  The patient's chest tube output was less than 20 mL, without air leak.         REEMA PRICE MD             D: 2018   T: 2018   MT: SILAS      Name:     MEI CHAUDHRY   MRN:      [id number]        Account:        [id number]   :      1970           Procedure Date: 2018      Document: [id number]

## 2018-11-27 NOTE — PROVIDER NOTIFICATION
0900 - Thoracic notified 1L bolus completed, 45 ml UOP over last 6 hrs. Dark donal in color. Encourage oral hydration. No new orders received at this time.     0930 - thoracic rounding at bedside, ordered 1 L NS bolus.

## 2018-11-27 NOTE — PROGRESS NOTES
"Thoracic Surgery Progress Note  11/27/2018    Pain controlled with PCA. Tolerated clears. Decreaed UOP as the night went on and was bolused 500 LR. Became aneuric this AM. LR bolus given this AM.     BP 97/70 (BP Location: Right arm)  Temp 98.2  F (36.8  C) (Axillary)  Resp 20  Ht 1.778 m (5' 10\")  Wt 126.3 kg (278 lb 7.1 oz)  SpO2 94%  BMI 39.95 kg/m2    PE  NAD  NLB on RA  Thoracotomy incision c/d/i, no drainage  Abd benign  Ext wwp  Coles in place with yellow urine    Labs  Na 134  K 4.5  Cr 1.86  WBC 17.9  Hg 13.2    A/P: 47M POD1 s/p plication of right diaphragm. Aneuric with IVON.     -continue PCA, Tylenol, continue home psych meds, gabapentin, propanolol, zoloft, epidural  -continue home BP meds (amlodipine)   -NS@100  -clears  -nephrology consult, appreciate recs. [place], renal ultrasound ordered  -lovenox 40 mg ppx     Discussed with staff, Dr. Elliott Colon.    Maryse Fraire MD PGY3  General Surgery    "

## 2018-11-27 NOTE — ANESTHESIA POST-OP FOLLOW-UP NOTE
"REGIONAL ANESTHESIA PAIN SERVICE EPIDURAL NOTE  Hal Clark is a 47 year old male POD #1 s/p THORACOSCOPIC DIAPHRAMIC PLICATION  THORACOTOMY and placement of T5-6 epidural catheter for pain management.      SUBJECTIVE  Interval History: Overnight no acute events. Patient jumps from one thought to another during interview, reports \"feels like I had surgery, but no pain\", states his pain is well controlled with epidural infusion, Tylenol scheduled and Dilaudid PC 0.2 mg doses, used 3.8 mg/past 8 hrs (see below).  Denies weakness, paresthesias, circumoral numbness, metallic taste or tinnitus.  Patient able to move upper and lower extremities in bed, reposition self to sitting upright in bed with minimal assist.  Currently tolerating a clear liquid diet, intermittent nausea, states he has history of nausea, GERD symptoms and current symptoms are not bothersome.  Urethral catheter in place, \"feels like I have to pee.\"                           Clinically Aligned Pain Assessment (CAPA):  Comfort (How is your pain?): Comfortably manageable  Change in Pain (Since your last medication/intervention?): About the same  Pain Control (How are your pain treatments working?):  Fully effective pain control  Functioning (Are you able to do activities to get better?) : Can do everything I need to do        Antithrombotic/Thrombolytic Therapy ordered:    enoxaparin (LOVENOX) injection 40 mg 40 mg, SC, Q24H            Analgesic Medications:  Medications related to Pain Management (Future)    Start       Dose/Rate Route Frequency Ordered Stop     11/27/18 0800   busPIRone (BUSPAR) tablet 5 mg       5 mg Oral EVERY MORNING 11/26/18 1932 11/27/18 0800   polyethylene glycol (MIRALAX/GLYCOLAX) Packet 17 g       17 g Oral 2 TIMES DAILY 11/27/18 0002        11/26/18 2000   senna-docusate (SENOKOT-S;PERICOLACE) 8.6-50 MG per tablet 1 tablet       1 tablet Oral 2 TIMES DAILY 11/26/18 1933 11/26/18 2000   senna-docusate " "(SENOKOT-S;PERICOLACE) 8.6-50 MG per tablet 2 tablet       2 tablet Oral 2 TIMES DAILY 11/26/18 1933        11/26/18 1945   acetaminophen (TYLENOL) tablet 975 mg       975 mg Oral EVERY 8 HOURS 11/26/18 1932 11/26/18 1932   lidocaine 1 % 1 mL       1 mL Other EVERY 1 HOUR PRN 11/26/18 1932 11/26/18 1932   lidocaine (LMX4) cream         Topical EVERY 1 HOUR PRN 11/26/18 1932 11/26/18 1932   cyclobenzaprine (FLEXERIL) tablet 10 mg       10 mg Oral 2 TIMES DAILY PRN 11/26/18 1932 11/26/18 1932   bupivacaine liposome (EXPAREL) LONG ACTING injection was administered into the infiltration site to produce postsurgical analgesia. Duration of action is up to 72 hours, and other \"massiel\" medications should not be given for 96 hours with the exception of the lidocaine 5% patch (LIDODERM) and the lidocaine 10mg in potassium infusions. This entry is for INFORMATION ONLY.         Does not apply CONTINUOUS PRN 11/26/18 1932 11/30/18 1931 11/26/18 1730   HYDROmorphone (DILAUDID) PCA 1 mg/mL OPIOID NAIVE         Intravenous CONTINUOUS 11/26/18 1716        11/26/18 1300   bupivacaine (MARCAINE) 0.125 % in sodium chloride 0.9 % 250 mL EPIDURAL Infusion       10 mL/hr  EPIDURAL CONTINUOUS 11/26/18 1257        11/26/18 1257   nalbuphine (NUBAIN) injection 2.5-5 mg       2.5-5 mg Intravenous EVERY 6 HOURS PRN 11/26/18 1257                OBJECTIVE  Lab Results:       Recent Labs   Lab Test  11/27/18   0452   WBC  17.9*   RBC  3.99*   HGB  13.2*   HCT  39.4*   MCV  99   MCH  33.1*   MCHC  33.5   RDW  14.5   PLT  275               Lab Results   Component Value Date     INR 0.99 11/20/2018         Vitals:                        Temp:  [97.6  F (36.4  C)-99.2  F (37.3  C)] 99.2  F (37.3  C)  Heart Rate:  [] 97  Resp:  [12-22] 20  BP: ()/() 106/89  MAP:  [96 mmHg-103 mmHg] 101 mmHg  Arterial Line BP: (109-129)/(82-87) 123/83  SpO2:  [90 %-100 %] 97 %  /89 (BP Location: Left arm)  Temp " "99.2  F (37.3  C) (Axillary)  Resp 20  Ht 1.778 m (5' 10\")  Wt 126.3 kg (278 lb 7.1 oz)  SpO2 97%  BMI 39.95 kg/m2         Exam:   GEN: alert and no distress  NEURO/MSK: Strength BLE 5/5  and overall symmetric  SKIN: Epidural catheter site with dressing c/d/i, no tenderness, erythema, heme, edema                 ASSESSMENT/PLAN:    Patient is receiving adequate analgesia with current multimodal therapy including T5-6 epidural catheter infusion of bupivacaine 0.125%  at 10 mL/hr.  Motor function intact and adequate sensory block, is meeting activity goals.  No evidence of adverse side effects related to local anesthetic. Low dark urine output via diaz, going to receive IV fluid bolus.     POD#1  - continue current epidural infusion Bupivacaine 0.125% at 10 mL/hour.  Plan to continue up to POD #5  - antithrombotic/thrombolytic therapy okay to continue enoxaparin Q 24 hours as ordered. Please contact RAPS (#[phone]) prior to any medication changes  - will continue to follow and adjust as needed     Anthony Hannah MD on 11/27/2018 at 11:53 AM  RAPS Fellow     RAPS Contact Info (24 hour job code pager is the last 4 digits) For in-house use only:   [employer] phone: [place] [phone], [place] [phone], [facility] [phone], then enter call-back number.    Text: Use [employer] on the Intranet <Paging/Directory> tab and enter Jobcode ID.   If no call back at any time, contact the hospital  and ask for RAPS attending or backup     "

## 2018-11-27 NOTE — OR NURSING
Pt not getting much relief from his epidural.  Dr. Frederick of anesthesia notified.  He came to bedside and delivered 8cc bolus of marcaine from the CADD pump.  Continuous rate also increased to 10cc per hour by MD.

## 2018-11-27 NOTE — PROGRESS NOTES
RAPS Note:    Evaluated patient in the PACU due to ongoing pain. Pain is incisional in nature, does not seem to exhibit respiratory variation, and is constant. Some improvement with IV analgesics but has received 100mcg fentanyl and 1mg hydromorphone in the last hour without significant relief. Epidural running at 8ml/hr but was not loaded or bolused. The patient's epidural had been running for about 2.5 hours prior to my evaluation.     On exam: Vitals were stable, patient was neurologically alert and oriented but intermittently somnolent. Epidural catheter was observed to be intact and connections were appropriately secured.     I bolused via the epidural infusion pump 7ml of 0.125% bupivacaine at 1814 and increased the baseline rate to 10ml/hr. On re-evaluation at 1845, the patient had improved pain and was about to be discharged from pacu to the floor. Hemodynamics were largely unchanged.    Raymond Patricia MD  CA3  [phone]

## 2018-11-27 NOTE — PROVIDER NOTIFICATION
Notified Surgery cross cover about pt complaining of increase pain in upper abdomin, upon assessment upper abdomin around diaphragm site firm and distended. CT in place, no air leak. Epidural & PCA in place for pain. Otherwise vitals unchanged and stable. Pt also noted to only have 50 ml of dark donal urine out via diaz cath since 0000. Dr. Pal plan to come assess patient at bedside. Per Dr. Pal, increase MIVF to 120 mL/h, continue diaz for strict I&O's, add melatonin to help promote rest. Writer will continue with plan of care and notify of any other changes.

## 2018-11-27 NOTE — PROVIDER NOTIFICATION
Thoracic notified CXR showed R apical pneumo increased in sized, CT to water seal at this time. VSS.  Second 1 L bolus completed, 18 ml UOP over last 4 hrs, dark donal. Order received to increase MIVF to 100 ml/hr. Will wait for Renal US, labs and Neph consult and continue to monitor.

## 2018-11-27 NOTE — PLAN OF CARE
Problem: Patient Care Overview  Goal: Plan of Care/Patient Progress Review  Outcome: No Change  A: A&Ox4, pt sarcastic with odd statements intermittently. VS unchanged, NC 2L with capno in place WNL. Sinus Tach. Afebrile. Incision pain & abdominal pain managed with dilaudid PCA & epidural. Denies nausea, pt receiving scheduled zofran. NPO with ice chips OK. D5 1/2NS with 20 meq K running at 50mL/h. Coles in place, urine output decreased (MD aware, see provider notification note). 1L LR bolus ordered at 0650 for low UO. Bowel sounds hypoactive, pt not passing gas. R chest tube to -20 suction, see flow sheet for out put. R incision back incision unchanged. Pt able to make needs known via call light.     I/O this shift:  In: 681.67 [P.O.:180; I.V.:501.67]  Out: 250 [Urine:150; Chest Tube:100]    Temp:  [97.6  F (36.4  C)-99.2  F (37.3  C)] 99.2  F (37.3  C)  Heart Rate:  [] 97  Resp:  [12-22] 20  BP: ()/() 106/89  MAP:  [96 mmHg-103 mmHg] 101 mmHg  Arterial Line BP: (109-129)/(82-87) 123/83  SpO2:  [90 %-100 %] 97 %     R: Continue with POC. Notify primary team with changes.    Problem: Chronic Respiratory Difficulty Comorbidity  Goal: Chronic Respiratory Difficulty  Patient comorbidity will be monitored for signs and symptoms of Respiratory Difficulty (Chronic) condition.  Problems will be absent, minimized or managed by discharge/transition of care.   Outcome: No Change  Pt denies SOB/Chest pain. On 2L NC sating >92%.

## 2018-11-27 NOTE — PROGRESS NOTES
Admission          11/26/2018  8:34 AM  -----------------------------------------------------------  Reason for admission: post-op for Right Thoracotomy with Diaphramic Plication  Primary team notified of pt arrival.  Admitted from: PACU  Via: stretcher  Accompanied by: PACU nurse.   Belongings: Placed in closet; valuables .  Admission Profile: complete  Teaching: orientation to unit and call light- call light within reach, call don't fall, use of console, meal times, when to call for the RN, and enforced importance of safety   Access: PIV  Telemetry:Placed on pt  Ht./Wt.: complete  2 RN Skin Assessment Completed By: Liliana RN and Yvette Rn.   Pt status: alert and oriented , verbalized understanding admission teaching . Able to use call light to let his needs known.     Temp:  [97.6  F (36.4  C)-98.9  F (37.2  C)] 98.4  F (36.9  C)  Heart Rate:  [75-98] 90  Resp:  [12-22] 20  BP: ()/() 128/99  MAP:  [96 mmHg-103 mmHg] 101 mmHg  Arterial Line BP: (109-129)/(82-87) 123/83  SpO2:  [90 %-100 %] 96 %

## 2018-11-28 ENCOUNTER — APPOINTMENT (OUTPATIENT)
Dept: PHYSICAL THERAPY | Facility: CLINIC | Age: 48
DRG: 163 | End: 2018-11-28
Attending: THORACIC SURGERY (CARDIOTHORACIC VASCULAR SURGERY)
Payer: COMMERCIAL

## 2018-11-28 LAB
ANION GAP SERPL CALCULATED.3IONS-SCNC: 8 MMOL/L (ref 3–14)
BACTERIA SPEC CULT: NO GROWTH
BUN SERPL-MCNC: 15 MG/DL (ref 7–30)
CALCIUM SERPL-MCNC: 8.1 MG/DL (ref 8.5–10.1)
CHLORIDE SERPL-SCNC: 101 MMOL/L (ref 94–109)
CO2 SERPL-SCNC: 20 MMOL/L (ref 20–32)
CREAT SERPL-MCNC: 1.42 MG/DL (ref 0.66–1.25)
ERYTHROCYTE [DISTWIDTH] IN BLOOD BY AUTOMATED COUNT: 14.4 % (ref 10–15)
GFR SERPL CREATININE-BSD FRML MDRD: 53 ML/MIN/1.7M2
GLUCOSE SERPL-MCNC: 100 MG/DL (ref 70–99)
HCT VFR BLD AUTO: 34 % (ref 40–53)
HGB BLD-MCNC: 11 G/DL (ref 13.3–17.7)
LACTATE BLD-SCNC: 0.8 MMOL/L (ref 0.7–2)
LACTATE BLD-SCNC: 2.8 MMOL/L (ref 0.7–2)
Lab: NORMAL
MCH RBC QN AUTO: 32.4 PG (ref 26.5–33)
MCHC RBC AUTO-ENTMCNC: 32.4 G/DL (ref 31.5–36.5)
MCV RBC AUTO: 100 FL (ref 78–100)
PLATELET # BLD AUTO: 224 10E9/L (ref 150–450)
POTASSIUM SERPL-SCNC: 4 MMOL/L (ref 3.4–5.3)
RBC # BLD AUTO: 3.39 10E12/L (ref 4.4–5.9)
SODIUM SERPL-SCNC: 130 MMOL/L (ref 133–144)
SPECIMEN SOURCE: NORMAL
WBC # BLD AUTO: 14.8 10E9/L (ref 4–11)

## 2018-11-28 PROCEDURE — 97162 PT EVAL MOD COMPLEX 30 MIN: CPT | Mod: GP | Performed by: PHYSICAL THERAPIST

## 2018-11-28 PROCEDURE — 94640 AIRWAY INHALATION TREATMENT: CPT

## 2018-11-28 PROCEDURE — 25000125 ZZHC RX 250: Performed by: PHYSICIAN ASSISTANT

## 2018-11-28 PROCEDURE — 25000128 H RX IP 250 OP 636: Performed by: SURGERY

## 2018-11-28 PROCEDURE — 97116 GAIT TRAINING THERAPY: CPT | Mod: GP | Performed by: PHYSICAL THERAPIST

## 2018-11-28 PROCEDURE — 25000132 ZZH RX MED GY IP 250 OP 250 PS 637: Performed by: SURGERY

## 2018-11-28 PROCEDURE — 25000132 ZZH RX MED GY IP 250 OP 250 PS 637: Performed by: THORACIC SURGERY (CARDIOTHORACIC VASCULAR SURGERY)

## 2018-11-28 PROCEDURE — 94640 AIRWAY INHALATION TREATMENT: CPT | Mod: 76

## 2018-11-28 PROCEDURE — 36415 COLL VENOUS BLD VENIPUNCTURE: CPT | Performed by: THORACIC SURGERY (CARDIOTHORACIC VASCULAR SURGERY)

## 2018-11-28 PROCEDURE — 40000275 ZZH STATISTIC RCP TIME EA 10 MIN

## 2018-11-28 PROCEDURE — 94660 CPAP INITIATION&MGMT: CPT

## 2018-11-28 PROCEDURE — 87040 BLOOD CULTURE FOR BACTERIA: CPT | Performed by: THORACIC SURGERY (CARDIOTHORACIC VASCULAR SURGERY)

## 2018-11-28 PROCEDURE — 85027 COMPLETE CBC AUTOMATED: CPT | Performed by: SURGERY

## 2018-11-28 PROCEDURE — 36415 COLL VENOUS BLD VENIPUNCTURE: CPT | Performed by: SURGERY

## 2018-11-28 PROCEDURE — 83605 ASSAY OF LACTIC ACID: CPT | Performed by: THORACIC SURGERY (CARDIOTHORACIC VASCULAR SURGERY)

## 2018-11-28 PROCEDURE — 83605 ASSAY OF LACTIC ACID: CPT

## 2018-11-28 PROCEDURE — 12000006 ZZH R&B IMCU INTERMEDIATE UMMC

## 2018-11-28 PROCEDURE — 25000128 H RX IP 250 OP 636: Performed by: THORACIC SURGERY (CARDIOTHORACIC VASCULAR SURGERY)

## 2018-11-28 PROCEDURE — 40000193 ZZH STATISTIC PT WARD VISIT: Performed by: PHYSICAL THERAPIST

## 2018-11-28 PROCEDURE — 97530 THERAPEUTIC ACTIVITIES: CPT | Mod: GP | Performed by: PHYSICAL THERAPIST

## 2018-11-28 PROCEDURE — 80048 BASIC METABOLIC PNL TOTAL CA: CPT | Performed by: SURGERY

## 2018-11-28 PROCEDURE — 25000128 H RX IP 250 OP 636: Performed by: STUDENT IN AN ORGANIZED HEALTH CARE EDUCATION/TRAINING PROGRAM

## 2018-11-28 RX ORDER — IPRATROPIUM BROMIDE AND ALBUTEROL SULFATE 2.5; .5 MG/3ML; MG/3ML
3 SOLUTION RESPIRATORY (INHALATION)
Status: DISCONTINUED | OUTPATIENT
Start: 2018-11-28 | End: 2018-12-02

## 2018-11-28 RX ORDER — SODIUM CHLORIDE FOR INHALATION 3 %
3 VIAL, NEBULIZER (ML) INHALATION
Status: DISCONTINUED | OUTPATIENT
Start: 2018-11-28 | End: 2018-12-02

## 2018-11-28 RX ADMIN — SERTRALINE HYDROCHLORIDE 50 MG: 25 TABLET ORAL at 07:24

## 2018-11-28 RX ADMIN — SODIUM CHLORIDE SOLN NEBU 3% 3 ML: 3 NEBU SOLN at 23:51

## 2018-11-28 RX ADMIN — SODIUM CHLORIDE SOLN NEBU 3% 3 ML: 3 NEBU SOLN at 19:22

## 2018-11-28 RX ADMIN — ACETAMINOPHEN 975 MG: 325 TABLET, FILM COATED ORAL at 19:48

## 2018-11-28 RX ADMIN — SODIUM CHLORIDE 500 ML: 9 INJECTION, SOLUTION INTRAVENOUS at 16:47

## 2018-11-28 RX ADMIN — FLUTICASONE PROPIONATE 2 SPRAY: 50 SPRAY, METERED NASAL at 07:23

## 2018-11-28 RX ADMIN — SODIUM CHLORIDE: 9 INJECTION, SOLUTION INTRAVENOUS at 05:13

## 2018-11-28 RX ADMIN — BUPIVACAINE HYDROCHLORIDE: 7.5 INJECTION, SOLUTION EPIDURAL; RETROBULBAR at 15:21

## 2018-11-28 RX ADMIN — SENNOSIDES AND DOCUSATE SODIUM 2 TABLET: 8.6; 5 TABLET ORAL at 07:23

## 2018-11-28 RX ADMIN — IPRATROPIUM BROMIDE AND ALBUTEROL SULFATE 3 ML: .5; 3 SOLUTION RESPIRATORY (INHALATION) at 23:51

## 2018-11-28 RX ADMIN — ACETAMINOPHEN 975 MG: 325 TABLET, FILM COATED ORAL at 03:28

## 2018-11-28 RX ADMIN — ACETAMINOPHEN 975 MG: 325 TABLET, FILM COATED ORAL at 13:01

## 2018-11-28 RX ADMIN — SODIUM CHLORIDE SOLN NEBU 3% 3 ML: 3 NEBU SOLN at 15:49

## 2018-11-28 RX ADMIN — SODIUM CHLORIDE SOLN NEBU 3% 3 ML: 3 NEBU SOLN at 12:30

## 2018-11-28 RX ADMIN — BUSPIRONE HYDROCHLORIDE 5 MG: 5 TABLET ORAL at 07:24

## 2018-11-28 RX ADMIN — IPRATROPIUM BROMIDE AND ALBUTEROL SULFATE 3 ML: .5; 3 SOLUTION RESPIRATORY (INHALATION) at 07:41

## 2018-11-28 RX ADMIN — IPRATROPIUM BROMIDE AND ALBUTEROL SULFATE 3 ML: .5; 3 SOLUTION RESPIRATORY (INHALATION) at 15:49

## 2018-11-28 RX ADMIN — POLYETHYLENE GLYCOL 3350 17 G: 17 POWDER, FOR SOLUTION ORAL at 07:23

## 2018-11-28 RX ADMIN — ONDANSETRON HYDROCHLORIDE 4 MG: 2 INJECTION INTRAMUSCULAR; INTRAVENOUS at 04:58

## 2018-11-28 RX ADMIN — Medication 7500 UNITS: at 21:22

## 2018-11-28 RX ADMIN — SODIUM CHLORIDE SOLN NEBU 3% 3 ML: 3 NEBU SOLN at 07:41

## 2018-11-28 RX ADMIN — ONDANSETRON HYDROCHLORIDE 4 MG: 2 INJECTION INTRAMUSCULAR; INTRAVENOUS at 13:02

## 2018-11-28 RX ADMIN — IPRATROPIUM BROMIDE AND ALBUTEROL SULFATE 3 ML: .5; 3 SOLUTION RESPIRATORY (INHALATION) at 12:30

## 2018-11-28 RX ADMIN — SENNOSIDES AND DOCUSATE SODIUM 1 TABLET: 8.6; 5 TABLET ORAL at 19:48

## 2018-11-28 RX ADMIN — IPRATROPIUM BROMIDE AND ALBUTEROL SULFATE 3 ML: .5; 3 SOLUTION RESPIRATORY (INHALATION) at 19:22

## 2018-11-28 RX ADMIN — Medication 7500 UNITS: at 05:07

## 2018-11-28 RX ADMIN — Medication 7500 UNITS: at 13:02

## 2018-11-28 ASSESSMENT — ACTIVITIES OF DAILY LIVING (ADL)
ADLS_ACUITY_SCORE: 11
ADLS_ACUITY_SCORE: 11
ADLS_ACUITY_SCORE: 10
ADLS_ACUITY_SCORE: 11
ADLS_ACUITY_SCORE: 11
ADLS_ACUITY_SCORE: 10
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
## 2018-11-28 NOTE — PLAN OF CARE
Problem: Patient Care Overview  Goal: Plan of Care/Patient Progress Review  Discharge Planner PT   Patient plan for discharge: home with girlfriend's assist; she can provide 24/7  Current status: PT 6B: PT eval complete, treatment provided. Pt initially required much encouragement for participation, appears to benefit from structured guidelines to help. Pt required CASSI-MODA x 1 supine>sit from bed nearly flat per R thoracotomy precautions, CASSI progressing to CGA sit<>stand. Displays gait deficits (see eval) therefore recommend walker use for longer hallway distances and ordered for pt's room. Pt encouraged to walk with assist and spend time up in chair 4x/day. Also noted desaturation to 77% on RA with initial trial of gait on RA, pt required 2-4L via NC for sats to be 90+% during activity.     PM PT: RN asked for PT assist suddenly due to pt incident. RN found pt on the floor attempting to stretch while alone. 4 staff required to help pt to get up from floor and back to bed.      Barriers to return to prior living situation: stairs, yet requires increased assist for bed mobility, walking tolerance reduced   Recommendations for discharge: TCU vs home; pending improved strength and also concern is home safety based on above incident today. Concern would be if pt was to be unable to get up at home as pt appears to have reduced insight into his deficits and current precautions. However if pt continues to progress to be safe to navigate stairs and ambulate household distances has potential to discharge to his 2nd story condo. Will depend on rate of progress/participation and LOS.   Rationale for recommendations: previously IND, does not have significant weakness but will certainly need to consistently mobilize to regain strength needed to safely return to home       Entered by: Kaylin Morrison 11/28/2018 1:10 PM   '

## 2018-11-28 NOTE — PROVIDER NOTIFICATION
Notified Dr. Francy Rueda that patient's lactic acid level is 2.8.   MD came to see patient and put in an order  ml bolus.

## 2018-11-28 NOTE — PROGRESS NOTES
Care Coordinator Progress Note    Admission Date/Time:  11/26/2018  Attending MD:  Winston Yeh, *    Data  Chart reviewed, discussed with interdisciplinary team.   Patient was admitted for: Right diaphragm paralysis    Concerns with insurance coverage for discharge needs: None identified  Current Living Situation: Patient lives with SO  Support System: Gabby WEISS is Primary Caregiver  Services Involved:   Transportation at Discharge: SO  Transportation to Medical Appointments: SO  Barriers to Discharge: Medical clearance      Assessment  Pt s/p right diaphragm plication 11/26/18, Pt continues on PCA and chest tube is in place. I have met with Pt to introduce myself and care coordinator role. Prior to admission Pt was independent, working at [employer] and lives with his SO in a Condo in [place]. Pt states his SO does not work and can assist as needed. Pt also states his Mother lives in a 1 level home and he could stay with her post discharge if steps are difficult for him. Home Care follow up discussed which Pt states he will not need, he feels confident  His mobility will improve when chest tube is removed.  Pt pleased with cares and currently has no outstanding questions or concerns.     Plan  Anticipated Discharge Date:  2-3 days  Anticipated Discharge Plan:  Discharge to home with Out Patient follow up    Marisa Carreno RN   6B care coordinator #[phone]

## 2018-11-28 NOTE — PROGRESS NOTES
"Thoracic Surgery Progress Note  11/28/2018    Pain controlled with PCA. Tolerated clears. Improved urine output but still oliguric and hyponatremic.    BP 94/60  Temp 97.9  F (36.6  C) (Oral)  Resp 12  Ht 1.778 m (5' 10\")  Wt 130.9 kg (288 lb 9.3 oz)  SpO2 95%  BMI 41.41 kg/m2    , 360 since midnight   Chest tube 510, 70 since midnight    PE  NAD  NLB   Thoracotomy incision c/d/i, no drainage, chest tube no leak  Abd benign  Ext wwp  Coles in place with yellow urine     Labs  Na 130 from 134  K 4   Cr 1.42 from 1.86  WBC 14.8   Hg 11      A/P: 47M POD2 s/p plication of right diaphragm. Initially aneuric with IVON, now oliguric with hyponatremia. Likely fluid overloaded.    -continue PCA, Tylenol, continue home psych meds, gabapentin, propanolol, zoloft, epidural  -hold home amlodipine, restart if hypertensive  -appreciate nephrology consult, will hold IVF   -fulls  -heparin ppx    Seen with fellow who discussed with staff.     Maryse Fraire MD PGY3  General Surgery    "

## 2018-11-28 NOTE — ANESTHESIA POST-OP FOLLOW-UP NOTE
REGIONAL ANESTHESIA PAIN SERVICE EPIDURAL NOTE  Hal Clark is a 48 year old male POD #2 s/p THORACOSCOPIC DIAPHRAMIC PLICATION  THORACOTOMY and placement of T5-6 epidural catheter for pain management.      SUBJECTIVE  Interval History: Overnight events:  Pt anxious and irritable at times.  Creatinine trend is rising. Patient reports adequate pain control with epidural infusion and current  analgesic medications (see below).  Denies weakness, paresthesias, circumoral numbness, metallic taste or tinnitus.  Patient isambulating with assistance.  Currently tolerating a CL diet, intermittent nausea no vomiting  Urinary catheter in place with strict I/O.                           Clinically Aligned Pain Assessment (CAPA):  Comfort (How is your pain?): Tolerable with discomfort  Change in Pain (Since your last medication/intervention?): About the same  Pain Control (How are your pain treatments working?):  Partially effective pain control  Functioning (Are you able to do activities to get better?) : Can do most things, but pain gets in the way of some   Sleep (Does your pain management allow you to sleep or rest?): Awake with occasional pain                           Pain Intensity using Numerical Rating Scale (NRS):    2/10 at rest and 8/10 with coughing        Antithrombotic/Thrombolytic Therapy ordered:    heparin injection 7,500 Units Given           7,500 Units, SC, Q8H [facility]     11/28 6847              Analgesic Medications:  Medications related to Pain Management (Future)    Start       Dose/Rate Route Frequency Ordered Stop     11/27/18 0800   busPIRone (BUSPAR) tablet 5 mg       5 mg Oral EVERY MORNING 11/26/18 1932 11/27/18 0800   polyethylene glycol (MIRALAX/GLYCOLAX) Packet 17 g       17 g Oral 2 TIMES DAILY 11/27/18 0002        11/26/18 2000   senna-docusate (SENOKOT-S;PERICOLACE) 8.6-50 MG per tablet 1 tablet       1 tablet Oral 2 TIMES DAILY 11/26/18 1933        11/26/18 2000   senna-docusate  "(SENOKOT-S;PERICOLACE) 8.6-50 MG per tablet 2 tablet       2 tablet Oral 2 TIMES DAILY 11/26/18 1933        11/26/18 1945   acetaminophen (TYLENOL) tablet 975 mg       975 mg Oral EVERY 8 HOURS 11/26/18 1932 11/26/18 1932   lidocaine 1 % 1 mL       1 mL Other EVERY 1 HOUR PRN 11/26/18 1932 11/26/18 1932   lidocaine (LMX4) cream         Topical EVERY 1 HOUR PRN 11/26/18 1932 11/26/18 1932   cyclobenzaprine (FLEXERIL) tablet 10 mg       10 mg Oral 2 TIMES DAILY PRN 11/26/18 1932 11/26/18 1932   bupivacaine liposome (EXPAREL) LONG ACTING injection was administered into the infiltration site to produce postsurgical analgesia. Duration of action is up to 72 hours, and other \"massiel\" medications should not be given for 96 hours with the exception of the lidocaine 5% patch (LIDODERM) and the lidocaine 10mg in potassium infusions. This entry is for INFORMATION ONLY.         Does not apply CONTINUOUS PRN 11/26/18 1932 11/30/18 1931 11/26/18 1730   HYDROmorphone (DILAUDID) PCA 1 mg/mL OPIOID NAIVE         Intravenous CONTINUOUS 11/26/18 1716        11/26/18 1300   bupivacaine (MARCAINE) 0.125 % in sodium chloride 0.9 % 250 mL EPIDURAL Infusion       10 mL/hr  EPIDURAL CONTINUOUS 11/26/18 1257        11/26/18 1257   nalbuphine (NUBAIN) injection 2.5-5 mg       2.5-5 mg Intravenous EVERY 6 HOURS PRN 11/26/18 1257                OBJECTIVE  Lab Results:       Recent Labs   Lab Test  11/27/18   1617   WBC  15.8*   RBC  3.44*   HGB  11.2*   HCT  34.2*   MCV  99   MCH  32.6   MCHC  32.7   RDW  14.4   PLT  207               Lab Results   Component Value Date     INR 0.99 11/20/2018         Vitals:                        Temp:  [97.8  F (36.6  C)-98.7  F (37.1  C)] 98.1  F (36.7  C)  Heart Rate:  [] 80  Resp:  [18-20] 20  BP: ()/(63-82) 106/71  SpO2:  [88 %-100 %] 98 %  /71 (BP Location: Left arm)  Temp 98.1  F (36.7  C) (Oral)  Resp 20  Ht 1.778 m (5' 10\")  Wt 130.9 kg (288 lb 9.3 " oz)  SpO2 98%  BMI 41.41 kg/m2         Exam:   GEN: alert and no distress  NEURO/MSK: Strength B/L LE 5/5  and overall symmetric  SKIN: Epidural catheter site with dressing c/d/i, no tenderness, erythema, heme, edema                 ASSESSMENT/PLAN:    Patient is receiving adequate analgesia with current multimodal therapy including T5-6 epidural catheter infusion of Bupivacaine 0.125%  at 10mL/hr.  Motor function intact and meeting activity goals.  No evidence of adverse side effects related to local anesthetic. Urinary catheter inplace with strict I/O - creatinine rising.  Pt has one R) chest tube.  Pain Summary report reviewed and pt used 2.6mg HYDROmorphone from PCA documented from last shift.       - 0830 increased epidural infusion Bupivacaine 0.125% at 12mL/hour, POD #2  - 1300 pt reports significant improvement in pain control after rate increase on epidural - Denies LAST symptoms  - antithrombotic/thrombolytic therapy: ok to continue heparin subcutaneous as scheduled. Please contact RAPS (#[id number]) prior to any medication changes  - will continue to follow and adjust as needed       Patient doing well. Pain control better since the increase in epidural rate. Patient using IS. Looking forward to going home soon.   Aris Del Real DO  [facility]  11/28/2018      RAPS Contact Info (24 hour job code pager is the last 4 digits) For in-house use only:   [employer] phone: [place] [phone], [place] [phone], [place] [phone], then enter call-back number.    Text: Use [employer] on the Intranet <Paging/Directory> tab and enter Jobcode ID.   If no call back at any time, contact the hospital  and ask for RAPS attending or backup

## 2018-11-28 NOTE — PROGRESS NOTES
REGIONAL ANESTHESIA PAIN SERVICE EPIDURAL NOTE  Hal Clark is a 48 year old male POD #2 s/p THORACOSCOPIC DIAPHRAMIC PLICATION  THORACOTOMY and placement of T5-6 epidural catheter for pain management.      SUBJECTIVE  Interval History: Overnight events:  Pt anxious and irritable at times.  Creatinine trend is rising. Patient reports adequate pain control with epidural infusion and current  analgesic medications (see below).  Denies weakness, paresthesias, circumoral numbness, metallic taste or tinnitus.  Patient isambulating with assistance.  Currently tolerating a CL diet, intermittent nausea no vomiting  Urinary catheter in place with strict I/O.     Clinically Aligned Pain Assessment (CAPA):  Comfort (How is your pain?): Tolerable with discomfort  Change in Pain (Since your last medication/intervention?): About the same  Pain Control (How are your pain treatments working?):  Partially effective pain control  Functioning (Are you able to do activities to get better?) : Can do most things, but pain gets in the way of some   Sleep (Does your pain management allow you to sleep or rest?): Awake with occasional pain     Pain Intensity using Numerical Rating Scale (NRS):    2/10 at rest and 8/10 with coughing      Antithrombotic/Thrombolytic Therapy ordered:    heparin injection 7,500 Units Given        7,500 Units, SC, Q8H [facility]    11/28 7383          Analgesic Medications:  Medications related to Pain Management (Future)    Start     Dose/Rate Route Frequency Ordered Stop    11/27/18 0800  busPIRone (BUSPAR) tablet 5 mg      5 mg Oral EVERY MORNING 11/26/18 1932 11/27/18 0800  polyethylene glycol (MIRALAX/GLYCOLAX) Packet 17 g      17 g Oral 2 TIMES DAILY 11/27/18 0002      11/26/18 2000  senna-docusate (SENOKOT-S;PERICOLACE) 8.6-50 MG per tablet 1 tablet      1 tablet Oral 2 TIMES DAILY 11/26/18 1933      11/26/18 2000  senna-docusate (SENOKOT-S;PERICOLACE) 8.6-50 MG per tablet 2 tablet      2 tablet Oral 2 TIMES  "DAILY 11/26/18 1933 11/26/18 1945  acetaminophen (TYLENOL) tablet 975 mg      975 mg Oral EVERY 8 HOURS 11/26/18 1932 11/26/18 1932  lidocaine 1 % 1 mL      1 mL Other EVERY 1 HOUR PRN 11/26/18 1932 11/26/18 1932  lidocaine (LMX4) cream       Topical EVERY 1 HOUR PRN 11/26/18 1932 11/26/18 1932  cyclobenzaprine (FLEXERIL) tablet 10 mg      10 mg Oral 2 TIMES DAILY PRN 11/26/18 1932 11/26/18 1932  bupivacaine liposome (EXPAREL) LONG ACTING injection was administered into the infiltration site to produce postsurgical analgesia. Duration of action is up to 72 hours, and other \"massiel\" medications should not be given for 96 hours with the exception of the lidocaine 5% patch (LIDODERM) and the lidocaine 10mg in potassium infusions. This entry is for INFORMATION ONLY.       Does not apply CONTINUOUS PRN 11/26/18 1932 11/30/18 1931 11/26/18 1730  HYDROmorphone (DILAUDID) PCA 1 mg/mL OPIOID NAIVE       Intravenous CONTINUOUS 11/26/18 1716      11/26/18 1300  bupivacaine (MARCAINE) 0.125 % in sodium chloride 0.9 % 250 mL EPIDURAL Infusion      10 mL/hr  EPIDURAL CONTINUOUS 11/26/18 1257      11/26/18 1257  nalbuphine (NUBAIN) injection 2.5-5 mg      2.5-5 mg Intravenous EVERY 6 HOURS PRN 11/26/18 1257             OBJECTIVE  Lab Results:   Recent Labs   Lab Test  11/27/18   1617   WBC  15.8*   RBC  3.44*   HGB  11.2*   HCT  34.2*   MCV  99   MCH  32.6   MCHC  32.7   RDW  14.4   PLT  207       Lab Results   Component Value Date    INR 0.99 11/20/2018       Vitals:    Temp:  [97.8  F (36.6  C)-98.7  F (37.1  C)] 98.1  F (36.7  C)  Heart Rate:  [] 80  Resp:  [18-20] 20  BP: ()/(63-82) 106/71  SpO2:  [88 %-100 %] 98 %  /71 (BP Location: Left arm)  Temp 98.1  F (36.7  C) (Oral)  Resp 20  Ht 1.778 m (5' 10\")  Wt 130.9 kg (288 lb 9.3 oz)  SpO2 98%  BMI 41.41 kg/m2       Exam:   GEN: alert and no distress  NEURO/MSK: Strength B/L LE 5/5  and overall symmetric  SKIN: Epidural catheter " site with dressing c/d/i, no tenderness, erythema, heme, edema     ASSESSMENT/PLAN:    Patient is receiving adequate analgesia with current multimodal therapy including T5-6 epidural catheter infusion of Bupivacaine 0.125%  at 10mL/hr.  Motor function intact and meeting activity goals.  No evidence of adverse side effects related to local anesthetic. Urinary catheter inplace with strict I/O - creatinine rising.  Pt has one R) chest tube.  Pain Summary report reviewed and pt used 2.6mg HYDROmorphone from PCA documented from last shift.      - 0830 increased epidural infusion Bupivacaine 0.125% at 12mL/hour, POD #2  - 1300 pt reports significant improvement in pain control after rate increase on epidural - Denies LAST symptoms  - antithrombotic/thrombolytic therapy: ok to continue heparin subcutaneous as scheduled. Please contact RAPS (#[phone]) prior to any medication changes  - will continue to follow and adjust as needed    - discussed plan with attending anesthesiologist    EMILY Perez CNP  [facility]  11/28/2018 7:04 AM    RAPS Contact Info (24 hour job code pager is the last 4 digits) For in-house use only:   [employer] phone: [place] [phone], [place] [phone], [facility] [phone], then enter call-back number.    Text: Use [employer] on the Intranet <Paging/Directory> tab and enter Jobcode ID.   If no call back at any time, contact the hospital  and ask for RAPS attending or backup

## 2018-11-28 NOTE — PLAN OF CARE
Problem: Patient Care Overview  Goal: Plan of Care/Patient Progress Review  Outcome: No Change    A: A&Ox4, pt anxious and irritable at beginning of shift improved overnight, pt remains forgetful. VS unchanged, NC 2L, attempted home CPAP with RT to bleed in O2 but pt had panic attack with pressure and removed overnight. Sinus Rhythm. Afebrile. Incision pain & abdominal pain managed with dilaudid PCA & epidural. Denies nausea, pt receiving scheduled zofran. Clear liquid diet. NS @ 100 mL/h. Coles in place, urine output decreased (MD aware, nephrology following continue strict I&O's). Bowel sounds hypoactive, pt not passing gas. R chest tube to -20 suction, see flow sheet for out put. R incision back incision unchanged. Pt able to make needs known via call light.     I/O this shift:  In: 1160 [P.O.:360; I.V.:800]  Out: 145 [Urine:125; Chest Tube:20]    Temp:  [97.8  F (36.6  C)-98.7  F (37.1  C)] 98.1  F (36.7  C)  Heart Rate:  [] 80  Resp:  [18-20] 20  BP: ()/(63-82) 106/71  SpO2:  [88 %-100 %] 98 %     R: Continue with POC. Notify primary team with changes.    Problem: Chronic Respiratory Difficulty Comorbidity  Goal: Chronic Respiratory Difficulty  Patient comorbidity will be monitored for signs and symptoms of Respiratory Difficulty (Chronic) condition.  Problems will be absent, minimized or managed by discharge/transition of care.   Outcome: No Change  SOB with anxiety during episode of CPAP overnight. 2L sating >92%. Frequent cough with small sputum production.

## 2018-11-28 NOTE — PLAN OF CARE
Neuro: A&Ox4. Forgetful   Cardiac: SR. VSS.   Respiratory: Sating >95% on 2 L NC.  GI/: Coles, 25 ml/hr, donal.   Diet/appetite: Full liquid diet.   Activity:  Assist of 1, up to chair and in halls.  Pain: At acceptable level on current regimen. Epidural rate increase to 12 ml/hr this AM. dilaudid PCA  Skin: No new deficits noted. Incision open to air.   LDA's: CT to -20 suction, Coles    Plan: Continue with POC. Notify primary team with changes.
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
## 2018-11-28 NOTE — PROVIDER NOTIFICATION
"Thoracic notified RN found pt sitting on the ground in his room, pt stated he did not fall however he \"sat down on the ground to stretch his quads.\" pt denies hitting his head. VSS, on 3 L NC, CT intact. Max assist of 3 and gait belt to assist pt up and to bed. Pt impulsive. Bed alarm on, will order chair alarm for when up in the chair. No new orders received at this time. Will continue to monitor.   "

## 2018-11-29 ENCOUNTER — APPOINTMENT (OUTPATIENT)
Dept: PHYSICAL THERAPY | Facility: CLINIC | Age: 48
DRG: 163 | End: 2018-11-29
Attending: THORACIC SURGERY (CARDIOTHORACIC VASCULAR SURGERY)
Payer: COMMERCIAL

## 2018-11-29 ENCOUNTER — APPOINTMENT (OUTPATIENT)
Dept: OCCUPATIONAL THERAPY | Facility: CLINIC | Age: 48
DRG: 163 | End: 2018-11-29
Attending: THORACIC SURGERY (CARDIOTHORACIC VASCULAR SURGERY)
Payer: COMMERCIAL

## 2018-11-29 ENCOUNTER — APPOINTMENT (OUTPATIENT)
Dept: GENERAL RADIOLOGY | Facility: CLINIC | Age: 48
DRG: 163 | End: 2018-11-29
Attending: THORACIC SURGERY (CARDIOTHORACIC VASCULAR SURGERY)
Payer: COMMERCIAL

## 2018-11-29 LAB
ANION GAP SERPL CALCULATED.3IONS-SCNC: 5 MMOL/L (ref 3–14)
ANION GAP SERPL CALCULATED.3IONS-SCNC: 8 MMOL/L (ref 3–14)
BASE DEFICIT BLDV-SCNC: 2.2 MMOL/L
BUN SERPL-MCNC: 15 MG/DL (ref 7–30)
BUN SERPL-MCNC: 16 MG/DL (ref 7–30)
CALCIUM SERPL-MCNC: 8.2 MG/DL (ref 8.5–10.1)
CALCIUM SERPL-MCNC: 8.3 MG/DL (ref 8.5–10.1)
CHLORIDE SERPL-SCNC: 100 MMOL/L (ref 94–109)
CHLORIDE SERPL-SCNC: 102 MMOL/L (ref 94–109)
CO2 SERPL-SCNC: 23 MMOL/L (ref 20–32)
CO2 SERPL-SCNC: 24 MMOL/L (ref 20–32)
CREAT SERPL-MCNC: 0.86 MG/DL (ref 0.66–1.25)
CREAT SERPL-MCNC: 0.96 MG/DL (ref 0.66–1.25)
ERYTHROCYTE [DISTWIDTH] IN BLOOD BY AUTOMATED COUNT: 14.6 % (ref 10–15)
GFR SERPL CREATININE-BSD FRML MDRD: 83 ML/MIN/1.7M2
GFR SERPL CREATININE-BSD FRML MDRD: >90 ML/MIN/1.7M2
GLUCOSE BLDC GLUCOMTR-MCNC: 82 MG/DL (ref 70–99)
GLUCOSE SERPL-MCNC: 93 MG/DL (ref 70–99)
GLUCOSE SERPL-MCNC: 96 MG/DL (ref 70–99)
HCO3 BLDV-SCNC: 24 MMOL/L (ref 21–28)
HCT VFR BLD AUTO: 30.9 % (ref 40–53)
HGB BLD-MCNC: 10 G/DL (ref 13.3–17.7)
LACTATE BLD-SCNC: 0.9 MMOL/L (ref 0.7–2)
MCH RBC QN AUTO: 31.8 PG (ref 26.5–33)
MCHC RBC AUTO-ENTMCNC: 32.4 G/DL (ref 31.5–36.5)
MCV RBC AUTO: 98 FL (ref 78–100)
O2/TOTAL GAS SETTING VFR VENT: 21 %
PCO2 BLDV: 44 MM HG (ref 40–50)
PH BLDV: 7.34 PH (ref 7.32–7.43)
PLATELET # BLD AUTO: 228 10E9/L (ref 150–450)
PLATELET # BLD AUTO: 239 10E9/L (ref 150–450)
PO2 BLDV: 22 MM HG (ref 25–47)
POTASSIUM SERPL-SCNC: 3.9 MMOL/L (ref 3.4–5.3)
POTASSIUM SERPL-SCNC: 4.2 MMOL/L (ref 3.4–5.3)
RBC # BLD AUTO: 3.14 10E12/L (ref 4.4–5.9)
SODIUM SERPL-SCNC: 131 MMOL/L (ref 133–144)
SODIUM SERPL-SCNC: 131 MMOL/L (ref 133–144)
WBC # BLD AUTO: 12.9 10E9/L (ref 4–11)

## 2018-11-29 PROCEDURE — 80048 BASIC METABOLIC PNL TOTAL CA: CPT | Performed by: STUDENT IN AN ORGANIZED HEALTH CARE EDUCATION/TRAINING PROGRAM

## 2018-11-29 PROCEDURE — 25000132 ZZH RX MED GY IP 250 OP 250 PS 637: Performed by: THORACIC SURGERY (CARDIOTHORACIC VASCULAR SURGERY)

## 2018-11-29 PROCEDURE — 25000128 H RX IP 250 OP 636: Performed by: STUDENT IN AN ORGANIZED HEALTH CARE EDUCATION/TRAINING PROGRAM

## 2018-11-29 PROCEDURE — 40000193 ZZH STATISTIC PT WARD VISIT

## 2018-11-29 PROCEDURE — 97535 SELF CARE MNGMENT TRAINING: CPT | Mod: GO

## 2018-11-29 PROCEDURE — 40000802 ZZH SITE CHECK

## 2018-11-29 PROCEDURE — 25000128 H RX IP 250 OP 636: Performed by: THORACIC SURGERY (CARDIOTHORACIC VASCULAR SURGERY)

## 2018-11-29 PROCEDURE — 36415 COLL VENOUS BLD VENIPUNCTURE: CPT | Performed by: THORACIC SURGERY (CARDIOTHORACIC VASCULAR SURGERY)

## 2018-11-29 PROCEDURE — 00000146 ZZHCL STATISTIC GLUCOSE BY METER IP

## 2018-11-29 PROCEDURE — 40000133 ZZH STATISTIC OT WARD VISIT

## 2018-11-29 PROCEDURE — 85049 AUTOMATED PLATELET COUNT: CPT | Performed by: SURGERY

## 2018-11-29 PROCEDURE — 97530 THERAPEUTIC ACTIVITIES: CPT | Mod: GO

## 2018-11-29 PROCEDURE — 94640 AIRWAY INHALATION TREATMENT: CPT

## 2018-11-29 PROCEDURE — 71045 X-RAY EXAM CHEST 1 VIEW: CPT

## 2018-11-29 PROCEDURE — 85027 COMPLETE CBC AUTOMATED: CPT | Performed by: STUDENT IN AN ORGANIZED HEALTH CARE EDUCATION/TRAINING PROGRAM

## 2018-11-29 PROCEDURE — 36415 COLL VENOUS BLD VENIPUNCTURE: CPT | Performed by: STUDENT IN AN ORGANIZED HEALTH CARE EDUCATION/TRAINING PROGRAM

## 2018-11-29 PROCEDURE — 25000125 ZZHC RX 250: Performed by: SURGERY

## 2018-11-29 PROCEDURE — 40000275 ZZH STATISTIC RCP TIME EA 10 MIN

## 2018-11-29 PROCEDURE — 25000132 ZZH RX MED GY IP 250 OP 250 PS 637: Performed by: SURGERY

## 2018-11-29 PROCEDURE — 94640 AIRWAY INHALATION TREATMENT: CPT | Mod: 76

## 2018-11-29 PROCEDURE — 25000125 ZZHC RX 250: Performed by: PHYSICIAN ASSISTANT

## 2018-11-29 PROCEDURE — 83605 ASSAY OF LACTIC ACID: CPT | Performed by: THORACIC SURGERY (CARDIOTHORACIC VASCULAR SURGERY)

## 2018-11-29 PROCEDURE — 71045 X-RAY EXAM CHEST 1 VIEW: CPT | Mod: 76

## 2018-11-29 PROCEDURE — 12000006 ZZH R&B IMCU INTERMEDIATE UMMC

## 2018-11-29 PROCEDURE — 25000128 H RX IP 250 OP 636

## 2018-11-29 PROCEDURE — 40000141 ZZH STATISTIC PERIPHERAL IV START W/O US GUIDANCE

## 2018-11-29 PROCEDURE — 82803 BLOOD GASES ANY COMBINATION: CPT | Performed by: STUDENT IN AN ORGANIZED HEALTH CARE EDUCATION/TRAINING PROGRAM

## 2018-11-29 PROCEDURE — 97530 THERAPEUTIC ACTIVITIES: CPT | Mod: GP

## 2018-11-29 RX ORDER — OXYCODONE HYDROCHLORIDE 5 MG/1
5 TABLET ORAL EVERY 4 HOURS PRN
Status: DISCONTINUED | OUTPATIENT
Start: 2018-11-29 | End: 2018-12-03 | Stop reason: HOSPADM

## 2018-11-29 RX ORDER — ACETYLCYSTEINE 100 MG/ML
4 SOLUTION ORAL; RESPIRATORY (INHALATION) EVERY 4 HOURS
Status: DISCONTINUED | OUTPATIENT
Start: 2018-11-29 | End: 2018-11-29

## 2018-11-29 RX ORDER — FUROSEMIDE 10 MG/ML
20 INJECTION INTRAMUSCULAR; INTRAVENOUS ONCE
Status: COMPLETED | OUTPATIENT
Start: 2018-11-29 | End: 2018-11-29

## 2018-11-29 RX ORDER — METHOCARBAMOL 500 MG/1
500 TABLET, FILM COATED ORAL 4 TIMES DAILY PRN
Status: DISCONTINUED | OUTPATIENT
Start: 2018-11-29 | End: 2018-11-29

## 2018-11-29 RX ORDER — HALOPERIDOL 5 MG/ML
INJECTION INTRAMUSCULAR
Status: COMPLETED
Start: 2018-11-29 | End: 2018-11-29

## 2018-11-29 RX ORDER — HALOPERIDOL 5 MG/ML
5 INJECTION INTRAMUSCULAR EVERY 6 HOURS PRN
Status: DISCONTINUED | OUTPATIENT
Start: 2018-11-29 | End: 2018-12-02

## 2018-11-29 RX ORDER — HALOPERIDOL 5 MG/ML
5 INJECTION INTRAMUSCULAR EVERY 6 HOURS PRN
Status: DISCONTINUED | OUTPATIENT
Start: 2018-11-29 | End: 2018-12-03 | Stop reason: HOSPADM

## 2018-11-29 RX ORDER — ACETYLCYSTEINE 100 MG/ML
4 SOLUTION ORAL; RESPIRATORY (INHALATION) EVERY 4 HOURS PRN
Status: DISCONTINUED | OUTPATIENT
Start: 2018-11-29 | End: 2018-12-03 | Stop reason: HOSPADM

## 2018-11-29 RX ORDER — HALOPERIDOL 5 MG/1
5 TABLET ORAL
Status: DISCONTINUED | OUTPATIENT
Start: 2018-11-29 | End: 2018-11-29

## 2018-11-29 RX ADMIN — POLYETHYLENE GLYCOL 3350 17 G: 17 POWDER, FOR SOLUTION ORAL at 09:16

## 2018-11-29 RX ADMIN — ACETAMINOPHEN 975 MG: 325 TABLET, FILM COATED ORAL at 04:59

## 2018-11-29 RX ADMIN — FUROSEMIDE 20 MG: 10 INJECTION, SOLUTION INTRAVENOUS at 09:13

## 2018-11-29 RX ADMIN — PROPRANOLOL HYDROCHLORIDE 80 MG: 80 CAPSULE, EXTENDED RELEASE ORAL at 09:13

## 2018-11-29 RX ADMIN — BUSPIRONE HYDROCHLORIDE 5 MG: 5 TABLET ORAL at 09:16

## 2018-11-29 RX ADMIN — IPRATROPIUM BROMIDE AND ALBUTEROL SULFATE 3 ML: .5; 3 SOLUTION RESPIRATORY (INHALATION) at 08:03

## 2018-11-29 RX ADMIN — HALOPERIDOL LACTATE 5 MG: 5 INJECTION, SOLUTION INTRAMUSCULAR at 22:05

## 2018-11-29 RX ADMIN — Medication 7500 UNITS: at 05:00

## 2018-11-29 RX ADMIN — IPRATROPIUM BROMIDE AND ALBUTEROL SULFATE 3 ML: .5; 3 SOLUTION RESPIRATORY (INHALATION) at 17:01

## 2018-11-29 RX ADMIN — BUPIVACAINE HYDROCHLORIDE: 7.5 INJECTION, SOLUTION EPIDURAL; RETROBULBAR at 12:02

## 2018-11-29 RX ADMIN — SERTRALINE HYDROCHLORIDE 50 MG: 25 TABLET ORAL at 09:14

## 2018-11-29 RX ADMIN — ACETAMINOPHEN 975 MG: 325 TABLET, FILM COATED ORAL at 12:20

## 2018-11-29 RX ADMIN — Medication 7500 UNITS: at 22:21

## 2018-11-29 RX ADMIN — IPRATROPIUM BROMIDE AND ALBUTEROL SULFATE 3 ML: .5; 3 SOLUTION RESPIRATORY (INHALATION) at 21:21

## 2018-11-29 RX ADMIN — IPRATROPIUM BROMIDE AND ALBUTEROL SULFATE 3 ML: .5; 3 SOLUTION RESPIRATORY (INHALATION) at 04:38

## 2018-11-29 RX ADMIN — SODIUM CHLORIDE SOLN NEBU 3% 3 ML: 3 NEBU SOLN at 17:02

## 2018-11-29 RX ADMIN — SODIUM CHLORIDE SOLN NEBU 3% 3 ML: 3 NEBU SOLN at 21:21

## 2018-11-29 RX ADMIN — Medication 7500 UNITS: at 14:52

## 2018-11-29 RX ADMIN — SODIUM CHLORIDE SOLN NEBU 3% 3 ML: 3 NEBU SOLN at 08:03

## 2018-11-29 RX ADMIN — HALOPERIDOL 5 MG: 5 INJECTION INTRAMUSCULAR at 22:05

## 2018-11-29 RX ADMIN — SENNOSIDES AND DOCUSATE SODIUM 2 TABLET: 8.6; 5 TABLET ORAL at 09:13

## 2018-11-29 ASSESSMENT — ACTIVITIES OF DAILY LIVING (ADL)
ADLS_ACUITY_SCORE: 11

## 2018-11-29 ASSESSMENT — PAIN DESCRIPTION - DESCRIPTORS: DESCRIPTORS: ACHING

## 2018-11-29 NOTE — PLAN OF CARE
Problem: Patient Care Overview  Goal: Plan of Care/Patient Progress Review  Neuro: A&Ox4. Forgetful. Some disorganized thinking. Unable to remember why certain lines/drains are in place and what they are there for.   Cardiac: SR w/ HR in the 80s. BPs 100s-115/70s. Temp WNL.   Respiratory: Sating >95 on 2 L NC. Some SOB reported per pt. Frequent productive cough.   GI/: Removed diaz this morning. Voided x2 since removal. Tap water enema given d/t no BM since 11/25. 600 mL liquid out after enema given.  Diet/appetite: Tolerating full liquid diet. No appetite.   Activity:  Assist of 1 to stand by. Walked with therapy today.   Pain: At acceptable level on current regimen. No complaints of pain except for slight pain with movement.   Skin: No new deficits noted. R incision ELVIS.   LDA's: R PIV SL. R chest tube to -20 cm H2O, no air leak, dressing changed today CDI.     Plan: Continue with POC. Notify primary team with changes.      Problem: Chronic Respiratory Difficulty Comorbidity  Goal: Chronic Respiratory Difficulty  Patient comorbidity will be monitored for signs and symptoms of Respiratory Difficulty (Chronic) condition.  Problems will be absent, minimized or managed by discharge/transition of care.   Sating > 95% on 2 L NC. Weaned from 3 L.

## 2018-11-29 NOTE — ANESTHESIA POST-OP FOLLOW-UP NOTE
REGIONAL ANESTHESIA PAIN SERVICE EPIDURAL NOTE  Hal Clark is a 48 year old male POD #3 s/p THORACOSCOPIC DIAPHRAMIC PLICATION  THORACOTOMY and placement of T5-6 epidural catheter for pain management.      SUBJECTIVE  Interval History: Overnight events:  . Patient reports adequate pain control with epidural infusion and current  analgesic medications (see below).  Denies weakness, paresthesias, circumoral numbness, metallic taste or tinnitus.  Patient is ambulating with assistance.  Currently tolerating a Cl diet, denies nausea or vomiting.  Coles catheter in place.                           Clinically Aligned Pain Assessment (CAPA):  Comfort (How is your pain?): Tolerable with discomfort  Change in Pain (Since your last medication/intervention?): Getting better  Pain Control (How are your pain treatments working?):  Partially effective pain control  Functioning (Are you able to do activities to get better?) : Can do most things, but pain gets in the way of some   Sleep (Does your pain management allow you to sleep or rest?): Awake with occasional pain        Antithrombotic/Thrombolytic Therapy ordered:    heparin injection 7,500 Units Given           7,500 Units, SC, Q8[facility]     11/29 0500              Analgesic Medications:  Medications related to Pain Management (Future)    Start       Dose/Rate Route Frequency Ordered Stop     11/27/18 0800   busPIRone (BUSPAR) tablet 5 mg       5 mg Oral EVERY MORNING 11/26/18 1932 11/27/18 0800   polyethylene glycol (MIRALAX/GLYCOLAX) Packet 17 g       17 g Oral 2 TIMES DAILY 11/27/18 0002        11/26/18 2000   senna-docusate (SENOKOT-S;PERICOLACE) 8.6-50 MG per tablet 1 tablet       1 tablet Oral 2 TIMES DAILY 11/26/18 1933 11/26/18 2000   senna-docusate (SENOKOT-S;PERICOLACE) 8.6-50 MG per tablet 2 tablet       2 tablet Oral 2 TIMES DAILY 11/26/18 1933 11/26/18 1945   acetaminophen (TYLENOL) tablet 975 mg       975 mg Oral EVERY 8 HOURS 11/26/18  "1932 11/26/18 1932   lidocaine 1 % 1 mL       1 mL Other EVERY 1 HOUR PRN 11/26/18 1932 11/26/18 1932   lidocaine (LMX4) cream         Topical EVERY 1 HOUR PRN 11/26/18 1932 11/26/18 1932   cyclobenzaprine (FLEXERIL) tablet 10 mg       10 mg Oral 2 TIMES DAILY PRN 11/26/18 1932 11/26/18 1932   bupivacaine liposome (EXPAREL) LONG ACTING injection was administered into the infiltration site to produce postsurgical analgesia. Duration of action is up to 72 hours, and other \"massiel\" medications should not be given for 96 hours with the exception of the lidocaine 5% patch (LIDODERM) and the lidocaine 10mg in potassium infusions. This entry is for INFORMATION ONLY.         Does not apply CONTINUOUS PRN 11/26/18 1932 11/30/18 1931 11/26/18 1730   HYDROmorphone (DILAUDID) PCA 1 mg/mL OPIOID NAIVE         Intravenous CONTINUOUS 11/26/18 1716        11/26/18 1300   bupivacaine (MARCAINE) 0.125 % in sodium chloride 0.9 % 250 mL EPIDURAL Infusion       12 mL/hr  EPIDURAL CONTINUOUS 11/26/18 1257        11/26/18 1257   nalbuphine (NUBAIN) injection 2.5-5 mg       2.5-5 mg Intravenous EVERY 6 HOURS PRN 11/26/18 1257                OBJECTIVE  Lab Results:        Recent Labs   Lab Test  11/29/18   0540  11/28/18   0656   WBC   --   14.8*   RBC   --   3.39*   HGB   --   11.0*   HCT   --   34.0*   MCV   --   100   MCH   --   32.4   MCHC   --   32.4   RDW   --   14.4   PLT  228  224               Lab Results   Component Value Date     INR 0.99 11/20/2018         Vitals:                        Temp:  [97.4  F (36.3  C)-98.6  F (37  C)] 97.8  F (36.6  C)  Heart Rate:  [81-95] 86  Resp:  [12-25] 25  BP: ()/(60-84) 105/71  SpO2:  [89 %-99 %] 92 %  /71 (BP Location: Left arm)  Temp 97.8  F (36.6  C) (Oral)  Resp 25  Ht 1.778 m (5' 10\")  Wt 131.8 kg (290 lb 9.1 oz)  SpO2 92%  BMI 41.69 kg/m2         Exam:   GEN: alert and no distress  NEURO/MSK: Strength B/L LE 5/5  and overall " symmetric  SKIN: Epidural catheter site with dressing c/d/i, no tenderness, erythema, heme, edema                 ASSESSMENT/PLAN:    Patient is receiving adequate analgesia with current multimodal therapy including T5-6 epidural catheter infusion of Bupivacaine 0.125% at 12mL/hr.  Motor function and meeting activity goals.  No evidence of adverse side effects related to local anesthetic. Urinary catheter was DC'd this morning.  Patient has one chest tube.     - continue current epidural infusion bupivacaine 0.125% at 12mL/hour, POD #3  - will discontinue epidural catheter in the next 1-2 days   - antithrombotic/thrombolytic therapy: ok to continue heparin subcutaneous as scheduled.  heparin injection 7,500 Units Given           7,500 Units, SC, Q8H [facility]     11/29 0500          Please contact RAPS (#[id number]) prior to any medication changes  - will continue to follow and adjust as needed       Russ Husain MD  [facility] Pain [facility]  11/29/2018     RAPS Contact Info (24 hour job code pager is the last 4 digits) For in-house use only:   [employer] phone: [place] [phone], [place] [phone], [facility] [phone], then enter call-back number.    Text: Use [email or web address] on the Intranet <Paging/Directory> tab and enter Jobcode ID.   If no call back at any time, contact the hospital  and ask for RAPS attending or backup

## 2018-11-29 NOTE — PROGRESS NOTES
STAFF ADDENDUM:  I saw and evaluated Mr. Clark and agree with the resident s findings and plan of care as documented in the resident s note and edited by me, as applicable.      In summary, Mr. Clark feels well.  Plan  Continue chest tube  Bowel regimen    Deborah Green MD

## 2018-11-29 NOTE — PROGRESS NOTES
REGIONAL ANESTHESIA PAIN SERVICE EPIDURAL NOTE  Hal Clark is a 48 year old male POD #3 s/p THORACOSCOPIC DIAPHRAMIC PLICATION  THORACOTOMY and placement of T5-6 epidural catheter for pain management.      SUBJECTIVE  Interval History: Overnight events:  . Patient reports adequate pain control with epidural infusion and current  analgesic medications (see below).  Denies weakness, paresthesias, circumoral numbness, metallic taste or tinnitus.  Patient is ambulating with assistance.  Currently tolerating a Cl diet, denies nausea or vomiting.  Coles catheter in place.     Clinically Aligned Pain Assessment (CAPA):  Comfort (How is your pain?): Tolerable with discomfort  Change in Pain (Since your last medication/intervention?): Getting better  Pain Control (How are your pain treatments working?):  Partially effective pain control  Functioning (Are you able to do activities to get better?) : Can do most things, but pain gets in the way of some   Sleep (Does your pain management allow you to sleep or rest?): Awake with occasional pain      Antithrombotic/Thrombolytic Therapy ordered:    heparin injection 7,500 Units Given        7,500 Units, SC, Q8H [facility]    11/29 0500          Analgesic Medications:  Medications related to Pain Management (Future)    Start     Dose/Rate Route Frequency Ordered Stop    11/27/18 0800  busPIRone (BUSPAR) tablet 5 mg      5 mg Oral EVERY MORNING 11/26/18 1932 11/27/18 0800  polyethylene glycol (MIRALAX/GLYCOLAX) Packet 17 g      17 g Oral 2 TIMES DAILY 11/27/18 0002      11/26/18 2000  senna-docusate (SENOKOT-S;PERICOLACE) 8.6-50 MG per tablet 1 tablet      1 tablet Oral 2 TIMES DAILY 11/26/18 1933 11/26/18 2000  senna-docusate (SENOKOT-S;PERICOLACE) 8.6-50 MG per tablet 2 tablet      2 tablet Oral 2 TIMES DAILY 11/26/18 1933 11/26/18 1945  acetaminophen (TYLENOL) tablet 975 mg      975 mg Oral EVERY 8 HOURS 11/26/18 1932 11/26/18 1932  lidocaine 1 % 1 mL      1 mL Other  "EVERY 1 HOUR PRN 11/26/18 1932 11/26/18 1932  lidocaine (LMX4) cream       Topical EVERY 1 HOUR PRN 11/26/18 1932 11/26/18 1932  cyclobenzaprine (FLEXERIL) tablet 10 mg      10 mg Oral 2 TIMES DAILY PRN 11/26/18 1932 11/26/18 1932  bupivacaine liposome (EXPAREL) LONG ACTING injection was administered into the infiltration site to produce postsurgical analgesia. Duration of action is up to 72 hours, and other \"massiel\" medications should not be given for 96 hours with the exception of the lidocaine 5% patch (LIDODERM) and the lidocaine 10mg in potassium infusions. This entry is for INFORMATION ONLY.       Does not apply CONTINUOUS PRN 11/26/18 1932 11/30/18 1931 11/26/18 1730  HYDROmorphone (DILAUDID) PCA 1 mg/mL OPIOID NAIVE       Intravenous CONTINUOUS 11/26/18 1716      11/26/18 1300  bupivacaine (MARCAINE) 0.125 % in sodium chloride 0.9 % 250 mL EPIDURAL Infusion      12 mL/hr  EPIDURAL CONTINUOUS 11/26/18 1257      11/26/18 1257  nalbuphine (NUBAIN) injection 2.5-5 mg      2.5-5 mg Intravenous EVERY 6 HOURS PRN 11/26/18 1257             OBJECTIVE  Lab Results:   Recent Labs   Lab Test  11/29/18   0540  11/28/18   0656   WBC   --   14.8*   RBC   --   3.39*   HGB   --   11.0*   HCT   --   34.0*   MCV   --   100   MCH   --   32.4   MCHC   --   32.4   RDW   --   14.4   PLT  228  224       Lab Results   Component Value Date    INR 0.99 11/20/2018       Vitals:    Temp:  [97.4  F (36.3  C)-98.6  F (37  C)] 97.8  F (36.6  C)  Heart Rate:  [81-95] 86  Resp:  [12-25] 25  BP: ()/(60-84) 105/71  SpO2:  [89 %-99 %] 92 %  /71 (BP Location: Left arm)  Temp 97.8  F (36.6  C) (Oral)  Resp 25  Ht 1.778 m (5' 10\")  Wt 131.8 kg (290 lb 9.1 oz)  SpO2 92%  BMI 41.69 kg/m2       Exam:   GEN: alert and no distress  NEURO/MSK: Strength B/L LE 5/5  and overall symmetric  SKIN: Epidural catheter site with dressing c/d/i, no tenderness, erythema, heme, edema     ASSESSMENT/PLAN:    Patient is receiving " adequate analgesia with current multimodal therapy including T5-6 epidural catheter infusion of Bupivacaine 0.125% at 12mL/hr.  Motor function and meeting activity goals.  No evidence of adverse side effects related to local anesthetic. Urinary catheter was DC'd this morning.  Patient has one chest tube.    - continue current epidural infusion bupivacaine 0.125% at 12mL/hour, POD #3  - will discontinue epidural catheter in the next 1-2 days   - antithrombotic/thrombolytic therapy: ok to continue heparin subcutaneous as scheduled.  heparin injection 7,500 Units Given        7,500 Units, SC, Q8H [facility]    11/29 0500        Please contact RAPS (#[id number]) prior to any medication changes  - will continue to follow and adjust as needed    - discussed plan with attending anesthesiologist    EMILY Perez CNP  [facility]  11/29/2018 7:00 AM    RAPS Contact Info (24 hour job code pager is the last 4 digits) For in-house use only:   [employer] phone: [place] [phone], [place] [phone], [facility] [phone], then enter call-back number.    Text: Use [employer] on the Intranet <Paging/Directory> tab and enter Jobcode ID.   If no call back at any time, contact the hospital  and ask for RAPS attending or backup

## 2018-11-29 NOTE — PLAN OF CARE
Problem: Patient Care Overview  Goal: Plan of Care/Patient Progress Review  PT-6B- Brief session today, primarily education on benefits of OOB activity and return to usual activities, review of thoracotomy precautions. Pt agreeable to 11am set-time tomorrow with PT to work on ambulation and possibly stairs. Did assess bed mobility this date, pt min A with verbal cues for bridging technique to reposition self in bed, supine>sit with SBA within precautions. Defer discharge recommendation until gait and stairs are assessed.

## 2018-11-29 NOTE — PLAN OF CARE
Problem: Patient Care Overview  Goal: Plan of Care/Patient Progress Review  Discharge Planner OT   Patient plan for discharge: Pt did not state.   Current status: Pt very impulsive, not waiting for cues to move and poses a significant fall risk with decreased safety awareness of lines and the environment. Pt up in room with FWW and CGA, at times carrying FWW and abandoning prematurely. Walking into furniture with FWW and observed to be lifting and attempting to throw FWW. Pt stood at sink ~4 minutes with close SBA while washing UB. Pt also desatting to ~84% after removing O2. O2 > 90% with reapplication of 2L O2 via NC. Pt incontinent of urine and with poor safety awareness during commode transfer. Tangential in thoughts and often starting sentences but unable to finish. SOB/coughing with activity. Min A for LB dressing, min A for UB dressing for management of tie, lines, and snaps. Unable to recall thoracotomy precautions after education.   Barriers to return to prior living situation: Cognition, impulsivity, decreased ADL I/safety  Recommendations for discharge: TCU  Rationale for recommendations: Pt at risk for falls due to impulsivity and decreased safety awareness. Deconditioning, SOB with activity, decreased ADL I/safety.        Entered by: Marina Hannah 11/29/2018 12:05 PM     OT 6B

## 2018-11-29 NOTE — PLAN OF CARE
"Problem: Patient Care Overview  Goal: Plan of Care/Patient Progress Review  Outcome: No Change  D: Pt recovering from R thoracotomy with R diaphragm plication 11/26.   A/I: Pt is oriented upon asking orientation questions, however continues to be forgetful and impulsive. Bed alarm on. CT remains to -[id number]. Epidural catheter remains in place. Dilaudid PCA, demand only. VSS; remains on 3L per NC. PRN duoneb given this morning for prolonged coughing fit, pt stating \"I feel like I can't take a deep enough breath in.\" Coughing up thin, tan sputum. Coles remains in place, good UO overnight- 675 ml this shift. No BM, pt receiving scheduled laxatives.   P: Consider removing Coles if UO continues to be adequate. Continue to support respiratory status. Would recommend working on weaning pain meds. increasing activity, and advancing diet.      "

## 2018-11-29 NOTE — PROGRESS NOTES
"Thoracic Surgery Progress Note  11/29/2018    Pain controlled with PCA. Tolerated clears. Sitting on floor yesterday for a time but did not fall. UOP improved.     /79 (BP Location: Left arm)  Temp 97.6  F (36.4  C) (Oral)  Resp 20  Ht 1.778 m (5' 10\")  Wt 131.5 kg (289 lb 14.5 oz)  SpO2 98%  BMI 41.6 kg/m2    , 610 since midnight   Chest tube 70, 380 since midnight    PE  NAD  NLB   Thoracotomy incision c/d/i, no drainage, chest tube no leak  Abd benign  Ext wwp  Diaz in place with yellow urine     Labs  Cr 0.96  Na 131      A/P: 47M POD3 s/p plication of right diaphragm. Initially aneuric with IVON, now resolved but with persistent hyponatremia. Likely fluid overloaded.    -will switch to PO pain meds, continue Tylenol, continue home psych meds, gabapentin, propanolol, zoloft, continue epidural  -hold home amlodipine, restart if hypertensive  -appreciate nephrology consult, given 20 mL lasix for likely hyponatremia from fluid overload   -fulls, bowel meds   -discontinue diaz, monitor for retention especially with epidural  -heparin ppx    Seen with fellow who discussed with staff.     Maryse Fraire MD PGY3  General Surgery    "

## 2018-11-30 ENCOUNTER — APPOINTMENT (OUTPATIENT)
Dept: PHYSICAL THERAPY | Facility: CLINIC | Age: 48
DRG: 163 | End: 2018-11-30
Attending: THORACIC SURGERY (CARDIOTHORACIC VASCULAR SURGERY)
Payer: COMMERCIAL

## 2018-11-30 ENCOUNTER — APPOINTMENT (OUTPATIENT)
Dept: ULTRASOUND IMAGING | Facility: CLINIC | Age: 48
DRG: 163 | End: 2018-11-30
Attending: THORACIC SURGERY (CARDIOTHORACIC VASCULAR SURGERY)
Payer: COMMERCIAL

## 2018-11-30 ENCOUNTER — APPOINTMENT (OUTPATIENT)
Dept: GENERAL RADIOLOGY | Facility: CLINIC | Age: 48
DRG: 163 | End: 2018-11-30
Attending: THORACIC SURGERY (CARDIOTHORACIC VASCULAR SURGERY)
Payer: COMMERCIAL

## 2018-11-30 LAB
ANION GAP SERPL CALCULATED.3IONS-SCNC: 10 MMOL/L (ref 3–14)
BASE DEFICIT BLDV-SCNC: 2.3 MMOL/L
BUN SERPL-MCNC: 13 MG/DL (ref 7–30)
CALCIUM SERPL-MCNC: 8.2 MG/DL (ref 8.5–10.1)
CHLORIDE SERPL-SCNC: 99 MMOL/L (ref 94–109)
CO2 SERPL-SCNC: 22 MMOL/L (ref 20–32)
CREAT SERPL-MCNC: 0.77 MG/DL (ref 0.66–1.25)
ERYTHROCYTE [DISTWIDTH] IN BLOOD BY AUTOMATED COUNT: 14.6 % (ref 10–15)
GFR SERPL CREATININE-BSD FRML MDRD: >90 ML/MIN/1.7M2
GLUCOSE SERPL-MCNC: 94 MG/DL (ref 70–99)
GRAM STN SPEC: ABNORMAL
HCO3 BLDV-SCNC: 22 MMOL/L (ref 21–28)
HCT VFR BLD AUTO: 30.5 % (ref 40–53)
HGB BLD-MCNC: 9.9 G/DL (ref 13.3–17.7)
INTERPRETATION ECG - MUSE: NORMAL
LACTATE BLD-SCNC: 0.8 MMOL/L (ref 0.7–2)
LACTATE BLD-SCNC: 0.9 MMOL/L (ref 0.7–2)
LACTATE BLD-SCNC: 1 MMOL/L (ref 0.7–2)
MCH RBC QN AUTO: 31.9 PG (ref 26.5–33)
MCHC RBC AUTO-ENTMCNC: 32.5 G/DL (ref 31.5–36.5)
MCV RBC AUTO: 98 FL (ref 78–100)
O2/TOTAL GAS SETTING VFR VENT: 21 %
PCO2 BLDV: 37 MM HG (ref 40–50)
PH BLDV: 7.39 PH (ref 7.32–7.43)
PLATELET # BLD AUTO: 239 10E9/L (ref 150–450)
PO2 BLDV: 38 MM HG (ref 25–47)
POTASSIUM SERPL-SCNC: 4.4 MMOL/L (ref 3.4–5.3)
RADIOLOGIST FLAGS: ABNORMAL
RBC # BLD AUTO: 3.1 10E12/L (ref 4.4–5.9)
SODIUM SERPL-SCNC: 131 MMOL/L (ref 133–144)
SPECIMEN SOURCE: ABNORMAL
WBC # BLD AUTO: 15.2 10E9/L (ref 4–11)

## 2018-11-30 PROCEDURE — 93010 ELECTROCARDIOGRAM REPORT: CPT | Performed by: INTERNAL MEDICINE

## 2018-11-30 PROCEDURE — 25000128 H RX IP 250 OP 636: Performed by: STUDENT IN AN ORGANIZED HEALTH CARE EDUCATION/TRAINING PROGRAM

## 2018-11-30 PROCEDURE — 93970 EXTREMITY STUDY: CPT

## 2018-11-30 PROCEDURE — 25000128 H RX IP 250 OP 636: Performed by: PHYSICIAN ASSISTANT

## 2018-11-30 PROCEDURE — 71046 X-RAY EXAM CHEST 2 VIEWS: CPT

## 2018-11-30 PROCEDURE — 87070 CULTURE OTHR SPECIMN AEROBIC: CPT | Performed by: THORACIC SURGERY (CARDIOTHORACIC VASCULAR SURGERY)

## 2018-11-30 PROCEDURE — 87205 SMEAR GRAM STAIN: CPT | Performed by: THORACIC SURGERY (CARDIOTHORACIC VASCULAR SURGERY)

## 2018-11-30 PROCEDURE — 83605 ASSAY OF LACTIC ACID: CPT

## 2018-11-30 PROCEDURE — 31622 DX BRONCHOSCOPE/WASH: CPT | Performed by: THORACIC SURGERY (CARDIOTHORACIC VASCULAR SURGERY)

## 2018-11-30 PROCEDURE — 25000125 ZZHC RX 250: Performed by: PHYSICIAN ASSISTANT

## 2018-11-30 PROCEDURE — 12000006 ZZH R&B IMCU INTERMEDIATE UMMC

## 2018-11-30 PROCEDURE — 83605 ASSAY OF LACTIC ACID: CPT | Performed by: THORACIC SURGERY (CARDIOTHORACIC VASCULAR SURGERY)

## 2018-11-30 PROCEDURE — 87102 FUNGUS ISOLATION CULTURE: CPT | Performed by: THORACIC SURGERY (CARDIOTHORACIC VASCULAR SURGERY)

## 2018-11-30 PROCEDURE — 93005 ELECTROCARDIOGRAM TRACING: CPT

## 2018-11-30 PROCEDURE — 0B978ZZ DRAINAGE OF LEFT MAIN BRONCHUS, VIA NATURAL OR ARTIFICIAL OPENING ENDOSCOPIC: ICD-10-PCS | Performed by: THORACIC SURGERY (CARDIOTHORACIC VASCULAR SURGERY)

## 2018-11-30 PROCEDURE — 85027 COMPLETE CBC AUTOMATED: CPT | Performed by: STUDENT IN AN ORGANIZED HEALTH CARE EDUCATION/TRAINING PROGRAM

## 2018-11-30 PROCEDURE — 36415 COLL VENOUS BLD VENIPUNCTURE: CPT | Performed by: THORACIC SURGERY (CARDIOTHORACIC VASCULAR SURGERY)

## 2018-11-30 PROCEDURE — 25000132 ZZH RX MED GY IP 250 OP 250 PS 637: Performed by: THORACIC SURGERY (CARDIOTHORACIC VASCULAR SURGERY)

## 2018-11-30 PROCEDURE — 87015 SPECIMEN INFECT AGNT CONCNTJ: CPT | Performed by: THORACIC SURGERY (CARDIOTHORACIC VASCULAR SURGERY)

## 2018-11-30 PROCEDURE — 97116 GAIT TRAINING THERAPY: CPT | Mod: GP

## 2018-11-30 PROCEDURE — 83605 ASSAY OF LACTIC ACID: CPT | Performed by: STUDENT IN AN ORGANIZED HEALTH CARE EDUCATION/TRAINING PROGRAM

## 2018-11-30 PROCEDURE — 97530 THERAPEUTIC ACTIVITIES: CPT | Mod: GP

## 2018-11-30 PROCEDURE — 94640 AIRWAY INHALATION TREATMENT: CPT | Mod: 76

## 2018-11-30 PROCEDURE — 36415 COLL VENOUS BLD VENIPUNCTURE: CPT | Performed by: STUDENT IN AN ORGANIZED HEALTH CARE EDUCATION/TRAINING PROGRAM

## 2018-11-30 PROCEDURE — 40000193 ZZH STATISTIC PT WARD VISIT

## 2018-11-30 PROCEDURE — 82803 BLOOD GASES ANY COMBINATION: CPT | Performed by: THORACIC SURGERY (CARDIOTHORACIC VASCULAR SURGERY)

## 2018-11-30 PROCEDURE — 40000275 ZZH STATISTIC RCP TIME EA 10 MIN

## 2018-11-30 PROCEDURE — 25000125 ZZHC RX 250: Performed by: THORACIC SURGERY (CARDIOTHORACIC VASCULAR SURGERY)

## 2018-11-30 PROCEDURE — 80048 BASIC METABOLIC PNL TOTAL CA: CPT | Performed by: STUDENT IN AN ORGANIZED HEALTH CARE EDUCATION/TRAINING PROGRAM

## 2018-11-30 PROCEDURE — 25000128 H RX IP 250 OP 636: Performed by: THORACIC SURGERY (CARDIOTHORACIC VASCULAR SURGERY)

## 2018-11-30 PROCEDURE — 25000132 ZZH RX MED GY IP 250 OP 250 PS 637: Performed by: SURGERY

## 2018-11-30 PROCEDURE — 94640 AIRWAY INHALATION TREATMENT: CPT

## 2018-11-30 RX ORDER — LIDOCAINE HYDROCHLORIDE 40 MG/ML
INJECTION, SOLUTION RETROBULBAR PRN
Status: DISCONTINUED | OUTPATIENT
Start: 2018-11-30 | End: 2018-11-30 | Stop reason: HOSPADM

## 2018-11-30 RX ORDER — FUROSEMIDE 10 MG/ML
20 INJECTION INTRAMUSCULAR; INTRAVENOUS
Status: DISPENSED | OUTPATIENT
Start: 2018-11-30 | End: 2018-12-02

## 2018-11-30 RX ORDER — FUROSEMIDE 10 MG/ML
40 INJECTION INTRAMUSCULAR; INTRAVENOUS ONCE
Status: COMPLETED | OUTPATIENT
Start: 2018-11-30 | End: 2018-11-30

## 2018-11-30 RX ADMIN — SODIUM CHLORIDE SOLN NEBU 3% 3 ML: 3 NEBU SOLN at 12:04

## 2018-11-30 RX ADMIN — Medication 7500 UNITS: at 06:25

## 2018-11-30 RX ADMIN — IPRATROPIUM BROMIDE AND ALBUTEROL SULFATE 3 ML: .5; 3 SOLUTION RESPIRATORY (INHALATION) at 12:04

## 2018-11-30 RX ADMIN — FUROSEMIDE 40 MG: 10 INJECTION, SOLUTION INTRAVENOUS at 16:12

## 2018-11-30 RX ADMIN — FUROSEMIDE 40 MG: 10 INJECTION, SOLUTION INTRAVENOUS at 09:26

## 2018-11-30 RX ADMIN — Medication 7500 UNITS: at 16:12

## 2018-11-30 RX ADMIN — FLUTICASONE PROPIONATE 2 SPRAY: 50 SPRAY, METERED NASAL at 09:25

## 2018-11-30 RX ADMIN — IPRATROPIUM BROMIDE AND ALBUTEROL SULFATE 3 ML: .5; 3 SOLUTION RESPIRATORY (INHALATION) at 04:25

## 2018-11-30 RX ADMIN — ACETAMINOPHEN 975 MG: 325 TABLET, FILM COATED ORAL at 21:09

## 2018-11-30 RX ADMIN — PROPRANOLOL HYDROCHLORIDE 80 MG: 80 CAPSULE, EXTENDED RELEASE ORAL at 09:25

## 2018-11-30 RX ADMIN — SENNOSIDES AND DOCUSATE SODIUM 2 TABLET: 8.6; 5 TABLET ORAL at 21:10

## 2018-11-30 RX ADMIN — SODIUM CHLORIDE SOLN NEBU 3% 3 ML: 3 NEBU SOLN at 16:48

## 2018-11-30 RX ADMIN — ACETAMINOPHEN 975 MG: 325 TABLET, FILM COATED ORAL at 12:33

## 2018-11-30 RX ADMIN — BUSPIRONE HYDROCHLORIDE 5 MG: 5 TABLET ORAL at 09:25

## 2018-11-30 RX ADMIN — SODIUM CHLORIDE SOLN NEBU 3% 3 ML: 3 NEBU SOLN at 08:06

## 2018-11-30 RX ADMIN — ACETAMINOPHEN 975 MG: 325 TABLET, FILM COATED ORAL at 04:36

## 2018-11-30 RX ADMIN — POLYETHYLENE GLYCOL 3350 17 G: 17 POWDER, FOR SOLUTION ORAL at 09:24

## 2018-11-30 RX ADMIN — FUROSEMIDE 20 MG: 10 INJECTION, SOLUTION INTRAVENOUS at 18:46

## 2018-11-30 RX ADMIN — Medication 7500 UNITS: at 23:27

## 2018-11-30 RX ADMIN — IPRATROPIUM BROMIDE AND ALBUTEROL SULFATE 3 ML: .5; 3 SOLUTION RESPIRATORY (INHALATION) at 16:48

## 2018-11-30 RX ADMIN — SERTRALINE HYDROCHLORIDE 50 MG: 25 TABLET ORAL at 09:25

## 2018-11-30 RX ADMIN — IPRATROPIUM BROMIDE AND ALBUTEROL SULFATE 3 ML: .5; 3 SOLUTION RESPIRATORY (INHALATION) at 08:06

## 2018-11-30 ASSESSMENT — ACTIVITIES OF DAILY LIVING (ADL)
ADLS_ACUITY_SCORE: 11

## 2018-11-30 NOTE — PLAN OF CARE
Problem: Patient Care Overview  Goal: Plan of Care/Patient Progress Review  Outcome: No Change  Neuro: A&Ox2. Forgetful with disorganized thinking. Confused to situation/place.   Cardiac: SR w/ HR in the 80s. BPs 100s-115/70s. Temp WNL.   Respiratory: Refusing O2 and CPAP majority of shift. Frequent productive cough.   GI/: Intake good. Possibly fluid up. Lasix ordered.  Diet/appetite: No appetite.   Activity:  Assist of 1 to stand by. High risk to pull tubes. Needs SBA  Pain: Restless and agitated. Grimacing. Took tylanol midway through shift after declining flexeril and oxycodone.  Skin: No new deficits noted. R incision ELVIS.   LDA's: R PIV SL. R chest tube to -20 cm H2O, no air leak.     Plan: Continue with POC. Notify primary team with changes.        Problem: Chronic Respiratory Difficulty Comorbidity  Goal: Chronic Respiratory Difficulty  Patient comorbidity will be monitored for signs and symptoms of Respiratory Difficulty (Chronic) condition.  Problems will be absent, minimized or managed by discharge/transition of care.   Sating > 95% on 2 L NC. Weaned from 3 L.

## 2018-11-30 NOTE — ADDENDUM NOTE
Addendum  created 11/30/18 1519 by Alfredito Land MD    Anesthesia Event edited, Procedure Event Log accessed, Sign clinical note

## 2018-11-30 NOTE — PLAN OF CARE
"Problem: Lung Surgery (via Thoracotomy) (Adult)  Intervention: Support Psychosocial Response to Life-changing Event/Hospitalization  Problem: Patient Care Overview  Goal: Plan of Care/Patient Progress Review  Outcome: No Change  /61 (BP Location: Left arm)  Temp 99.1  F (37.3  C) (Axillary)  Resp 22  Ht 1.778 m (5' 10\")  Wt 131.5 kg (289 lb 14.5 oz)  SpO2 99%  BMI 41.6 kg/m2  D: Pt recovering from R thoracotomy with R diaphragm plication 11/26.   A/I: Pt is oriented upon asking orientation questions, however continues to be forgetful and impulsive and agitated. Keeps saying he wants to leave, pulled most tubes and threatens to remove chest tube. Bed alarm on and VPM, family at bedside now but low threshold of putting a bedside attendant in place. CT remains to -20cmH2O-minimal output. Epidural catheter was replaced by anaesthesia after patient pulled it. Refused pain meds. VSS; remains on 3L per NC. Refused scheduled  Nebs this evening.  Pt exhibiting some respiratory distress this evening, pt stating \"I feel like I can't take a deep enough breath in.\" Coughing up thin, tan sputum-MD was contacted, chest X-ray and labs done. Coles discontinued-adequate  Urine output,   P: PRN Haldol ordered to help with agitation. Continue to support respiratory status.            "

## 2018-11-30 NOTE — OR NURSING
Procedure: Bronchoscopy with wash out on right lung.  Sedation: None  O2: 2-8 L  Tolerated Procedure: Good medically, but did not tolerate the procedure very well.    Patient returned to recovery room in stable condition with RN  Other: Report called to AMARA Alcaraz on floor.  Adrienne Hall RN

## 2018-11-30 NOTE — ANESTHESIA POST-OP FOLLOW-UP NOTE
REGIONAL ANESTHESIA PAIN SERVICE EPIDURAL NOTE  Hal Clark is a 48 year old male POD #4 s/p THORACOSCOPIC DIAPHRAMIC PLICATION  THORACOTOMY and placement of T5-6 epidural catheter for pain management.      SUBJECTIVE  Interval History: Overnight events:  Agitated overnight pulling at lines. Patient reports adequate pain control with epidural infusion and current  analgesic medications (see below).  No weakness, paresthesias, circumoral numbness, metallic taste or tinnitus.  Patient is ambulating with assistance.  Currently tolerating a  diet, denies nausea or vomiting. Coles was removed yesterday.                           Clinically Aligned Pain Assessment (CAPA):  Comfort (How is your pain?): Negligible pain  Change in Pain (Since your last medication/intervention?): Getting better  Pain Control (How are your pain treatments working?):  Fully effective pain control  Functioning (Are you able to do activities to get better?) : Can do everything I need to do  Sleep (Does your pain management allow you to sleep or rest?): awake due to anxiety                           Pain Intensity using Numerical Rating Scale (NRS):    0/10 at rest and 0/10 with activity        Antithrombotic/Thrombolytic Therapy ordered:    heparin injection 7,500 Units Given           7,500 Units, SC, Q8H [facility]     11/30 0625              Analgesic Medications:  Medications related to Pain Management (Future)    Start       Dose/Rate Route Frequency Ordered Stop     11/29/18 0904   oxyCODONE (ROXICODONE) tablet 5 mg       5 mg Oral EVERY 4 HOURS PRN 11/29/18 0904        11/27/18 0800   busPIRone (BUSPAR) tablet 5 mg       5 mg Oral EVERY MORNING 11/26/18 1932 11/27/18 0800   polyethylene glycol (MIRALAX/GLYCOLAX) Packet 17 g       17 g Oral 2 TIMES DAILY 11/27/18 0002        11/26/18 2000   senna-docusate (SENOKOT-S;PERICOLACE) 8.6-50 MG per tablet 1 tablet       1 tablet Oral 2 TIMES DAILY 11/26/18 1933 11/26/18 2000    "senna-docusate (SENOKOT-S;PERICOLACE) 8.6-50 MG per tablet 2 tablet       2 tablet Oral 2 TIMES DAILY 11/26/18 1933 11/26/18 1945   acetaminophen (TYLENOL) tablet 975 mg       975 mg Oral EVERY 8 HOURS 11/26/18 1932 11/26/18 1932   lidocaine 1 % 1 mL       1 mL Other EVERY 1 HOUR PRN 11/26/18 1932 11/26/18 1932   lidocaine (LMX4) cream         Topical EVERY 1 HOUR PRN 11/26/18 1932 11/26/18 1932   cyclobenzaprine (FLEXERIL) tablet 10 mg       10 mg Oral 2 TIMES DAILY PRN 11/26/18 1932 11/26/18 1932   bupivacaine liposome (EXPAREL) LONG ACTING injection was administered into the infiltration site to produce postsurgical analgesia. Duration of action is up to 72 hours, and other \"massiel\" medications should not be given for 96 hours with the exception of the lidocaine 5% patch (LIDODERM) and the lidocaine 10mg in potassium infusions. This entry is for INFORMATION ONLY.         Does not apply CONTINUOUS PRN 11/26/18 1932 11/30/18 1931 11/26/18 1300   bupivacaine (MARCAINE) 0.125 % in sodium chloride 0.9 % 250 mL EPIDURAL Infusion       12 mL/hr  EPIDURAL CONTINUOUS 11/26/18 1257        11/26/18 1257   nalbuphine (NUBAIN) injection 2.5-5 mg       2.5-5 mg Intravenous EVERY 6 HOURS PRN 11/26/18 1257                OBJECTIVE  Lab Results:   Recent Labs   Lab Test  11/30/18   0450   WBC  15.2*   RBC  3.10*   HGB  9.9*   HCT  30.5*   MCV  98   MCH  31.9   MCHC  32.5   RDW  14.6   PLT  239               Lab Results   Component Value Date     INR 0.99 11/20/2018         Vitals:                        Temp:  [97.6  F (36.4  C)-99.1  F (37.3  C)] 97.7  F (36.5  C)  Heart Rate:  [79-85] 80  Resp:  [18-24] 24  BP: (101-117)/(61-79) 115/74  SpO2:  [95 %-99 %] 96 %  /74  Temp 97.7  F (36.5  C) (Oral)  Resp 24  Ht 1.778 m (5' 10\")  Wt 131.5 kg (289 lb 14.5 oz)  SpO2 96%  BMI 41.6 kg/m2         Exam:   GEN: alert and no distress sitting in recliner  NEURO/MSK: Strength B/L LE 5/5  and " "overall symmetric  SKIN: Epidural catheter site with dressing c/d/i, no tenderness, erythema, heme, edema                 ASSESSMENT/PLAN:    Patient is receiving adequate analgesia with current multimodal therapy including T5-6 epidural catheter infusion of Bupivacaine 0.125%  at 12mL/hr.  Motor function intact and meeting activity goals.  No evidence of adverse side effects related to local anesthetic.      - 0800 Epidural infusion stopped   - 1035 reports pain \"0\" discontinued epidural catheter today dark tip intact without complication, POD #4  - antithrombotic/thrombolytic therapy: last dose of heparin subcutaneous was today at 0625.  - RN may give heparin at 1130 or later - RN aware  - will sign off/call for questions or concerns     Alfredito Land   [facility]  11/30/2018 3:19 PM      RAPS Contact Info (24 hour job code pager is the last 4 digits) For in-house use only:   [employer] phone: [place] [phone], [place]--[phone], [facility] [phone], then enter call-back number.    Text: Use [email or web address] on the Intranet <Paging/Directory> tab and enter Jobcode ID.   If no call back at any time, contact the hospital  and ask for RAPS attending or backup     "

## 2018-11-30 NOTE — PROCEDURES
THORACIC SURGERY BEDSIDE PROCEDURE   NAME: Hal Clark   MRN: [id number]  : 1970    Diagnosis:  Right pulmonary opacities, Hypoxia    Procedure: Flexible bronchoscopy     Specimen: Left bronchial washings for culture    Premedication: None    Procedure Meds: 1% topical lidocaine solution at the dejon    Procedure: A timeout was called to review the case and patient information. The patient was positioned supine. The vocal folds were passed without difficulty appeared to be achieving appropriate apposition.  Bilateral tracheobronchial trees were inspected closely to the level of the subsegmental bronchi.  Secretions were found to be thin on the right side and white on the left.  These were lavaged and sent for culture, and evacuated without difficulty. The procedure was completed and the patient tolerated the procedure well and without complications.      Plan: VBG shows normal CO2. Minimal O2 requirement. Likely re-expansion right pulmonary edema. Will follow up on cultures. Aggressive pulmonary toilette.     Winston Arnold MD

## 2018-11-30 NOTE — PROGRESS NOTES
REGIONAL ANESTHESIA PAIN SERVICE EPIDURAL NOTE  Hal Clark is a 48 year old male POD #4 s/p THORACOSCOPIC DIAPHRAMIC PLICATION  THORACOTOMY and placement of T5-6 epidural catheter for pain management.      SUBJECTIVE  Interval History: Overnight events:  Agitated overnight pulling at lines. Patient reports adequate pain control with epidural infusion and current  analgesic medications (see below).  No weakness, paresthesias, circumoral numbness, metallic taste or tinnitus.  Patient is ambulating with assistance.  Currently tolerating a  diet, denies nausea or vomiting. Coles was removed yesterday.     Clinically Aligned Pain Assessment (CAPA):  Comfort (How is your pain?): Negligible pain  Change in Pain (Since your last medication/intervention?): Getting better  Pain Control (How are your pain treatments working?):  Fully effective pain control  Functioning (Are you able to do activities to get better?) : Can do everything I need to do  Sleep (Does your pain management allow you to sleep or rest?): awake due to anxiety     Pain Intensity using Numerical Rating Scale (NRS):    0/10 at rest and 0/10 with activity      Antithrombotic/Thrombolytic Therapy ordered:    heparin injection 7,500 Units Given        7,500 Units, SC, Q8H [facility]    11/30 0625          Analgesic Medications:  Medications related to Pain Management (Future)    Start     Dose/Rate Route Frequency Ordered Stop    11/29/18 0904  oxyCODONE (ROXICODONE) tablet 5 mg      5 mg Oral EVERY 4 HOURS PRN 11/29/18 0904      11/27/18 0800  busPIRone (BUSPAR) tablet 5 mg      5 mg Oral EVERY MORNING 11/26/18 1932 11/27/18 0800  polyethylene glycol (MIRALAX/GLYCOLAX) Packet 17 g      17 g Oral 2 TIMES DAILY 11/27/18 0002      11/26/18 2000  senna-docusate (SENOKOT-S;PERICOLACE) 8.6-50 MG per tablet 1 tablet      1 tablet Oral 2 TIMES DAILY 11/26/18 1933 11/26/18 2000  senna-docusate (SENOKOT-S;PERICOLACE) 8.6-50 MG per tablet 2 tablet      2 tablet Oral  "2 TIMES DAILY 11/26/18 1933 11/26/18 1945  acetaminophen (TYLENOL) tablet 975 mg      975 mg Oral EVERY 8 HOURS 11/26/18 1932 11/26/18 1932  lidocaine 1 % 1 mL      1 mL Other EVERY 1 HOUR PRN 11/26/18 1932 11/26/18 1932  lidocaine (LMX4) cream       Topical EVERY 1 HOUR PRN 11/26/18 1932 11/26/18 1932  cyclobenzaprine (FLEXERIL) tablet 10 mg      10 mg Oral 2 TIMES DAILY PRN 11/26/18 1932 11/26/18 1932  bupivacaine liposome (EXPAREL) LONG ACTING injection was administered into the infiltration site to produce postsurgical analgesia. Duration of action is up to 72 hours, and other \"massiel\" medications should not be given for 96 hours with the exception of the lidocaine 5% patch (LIDODERM) and the lidocaine 10mg in potassium infusions. This entry is for INFORMATION ONLY.       Does not apply CONTINUOUS PRN 11/26/18 1932 11/30/18 1931 11/26/18 1300  bupivacaine (MARCAINE) 0.125 % in sodium chloride 0.9 % 250 mL EPIDURAL Infusion      12 mL/hr  EPIDURAL CONTINUOUS 11/26/18 1257      11/26/18 1257  nalbuphine (NUBAIN) injection 2.5-5 mg      2.5-5 mg Intravenous EVERY 6 HOURS PRN 11/26/18 1257             OBJECTIVE  Lab Results:   Recent Labs   Lab Test  11/30/18   0450   WBC  15.2*   RBC  3.10*   HGB  9.9*   HCT  30.5*   MCV  98   MCH  31.9   MCHC  32.5   RDW  14.6   PLT  239       Lab Results   Component Value Date    INR 0.99 11/20/2018       Vitals:    Temp:  [97.6  F (36.4  C)-99.1  F (37.3  C)] 97.7  F (36.5  C)  Heart Rate:  [79-85] 80  Resp:  [18-24] 24  BP: (101-117)/(61-79) 115/74  SpO2:  [95 %-99 %] 96 %  /74  Temp 97.7  F (36.5  C) (Oral)  Resp 24  Ht 1.778 m (5' 10\")  Wt 131.5 kg (289 lb 14.5 oz)  SpO2 96%  BMI 41.6 kg/m2       Exam:   GEN: alert and no distress sitting in recliner  NEURO/MSK: Strength B/L LE 5/5  and overall symmetric  SKIN: Epidural catheter site with dressing c/d/i, no tenderness, erythema, heme, edema     ASSESSMENT/PLAN:    Patient is receiving " "adequate analgesia with current multimodal therapy including T5-6 epidural catheter infusion of Bupivacaine 0.125%  at 12mL/hr.  Motor function intact and meeting activity goals.  No evidence of adverse side effects related to local anesthetic.     - 0800 Epidural infusion stopped   - 1035 reports pain \"0\" discontinued epidural catheter today dark tip intact without complication, POD #4  - antithrombotic/thrombolytic therapy: last dose of heparin subcutaneous was today at 0625.  - RN may give heparin at 1130 or later - RN aware  - will sign off/call for questions or concerns    - discussed plan with attending anesthesiologist    EMILY Perez CNP  Regional Anesthesia Pain Service  11/30/2018 7:21 AM    RAPS Contact Info (24 hour job code pager is the last 4 digits) For in-house use only:   [employer] phone: [employer] [phone], [employer] [phone], [employer] [phone], then enter call-back number.    Text: Use [employer] on the Intranet <Paging/Directory> tab and enter Jobcode ID.   If no call back at any time, contact the hospital  and ask for RAPS attending or backup   "

## 2018-11-30 NOTE — PLAN OF CARE
Problem: Patient Care Overview  Goal: Interdisciplinary Rounds/Family Conf  Outcome: Declining  Pt very agitated, restless, confused, pulling and disconnecting lines and wants to go home. Security was called, MD was contacted, pt seems short of breath but sating well, vitals stable.

## 2018-11-30 NOTE — PROGRESS NOTES
Cross-cover note: 8:05 PM 11/29/2018 11/29/2018    General Surgery Cross Cover Note    Was called by nursing regarding acute onset shortness of breath. Saw patient, he was satting well on 2 L NC (baseline), and shortness of breath had resolved. Moments later a code 21 was called when the patient became agitated and disoriented and began pulling out lines including epidural. He was agitated and suspicious, but redirectable.     Approximately one hour later, the patient attempted to run out of his room and threatened to pull his chest tube, and a second code 21 was called. He was significantly less redirectable, but ultimately agreeable to waiting til the AM to discuss leaving the hospital. He received a 5 mg dose of haldol at this time, and girlfriend Gabby was at bedside.     B/P: 116/61, T: 99.1, P: Data Unavailable, R: 22    PE:   A&O x2, agitated  Neuro: right pupil miotic  Breathing somewhat-labored on 2L Nc  Abd soft, non-tender, non-distended  Extr. Warm to touch  Incisions clean, dry, intact, some bruising around incision. Chest tube site with clean dressings    Plan:    Obtained CBC, BMP, lactic acid, and VBG. No suspicion of infection or acute respiratory process based on results. Discussed miosis with neurology, determined this has been present for many years, and head CT for suspicion of acute stroke not necessary. Plan to minimize disruptions for the rest of the night, and will re-dose haldol if necessary.  Discussed with nursing staff.     Bianka Pal MD  PGY-1 Surgery Crosscover  [phone]

## 2018-11-30 NOTE — PROGRESS NOTES
Regional Anesthesiology Note:    Paged by the bedside nurse around 2000 regarding the patient's epidural. It seems, that the patient has been more altered and agitated today and seems to have accidentally pulled on the catheter -  it from the epidural infusion pump at some point. It is unclear exactly when this may have happened as it was not witnessed firsthand.     S: The patient appears restless but at times more somnolent. He voices a need to stand up or sit in a chair which the bedside nurses are facilitating. He has not complained of any pain but also requires frequent re-orienting to answer a specific question.     O: VSS, SpO2 98% on 3L NC.   Epidural catheter is disconnected from the pump at the camp/attachment piece and looks like the distal part near the connector piece was stretched. The proximal epidural is intact and the connection site to the patient is completely intact and undisturbed.     A: Disconnected Epidural    P: Using sterile technique the distal 5cm of the epidural catheter was excised and the connector piece was cleaned with chlorhexidine and attached to the new, structurally normal epidural catheter. This connector was then attached to the pump tubing and the catheter was tested with a bolus of 3cc of Normal Saline. This flushed easily with no visible leaks. The epidural infusion pump was then re-started at the previous rate (12ml/hr).    - Please do not hesitate to contact the regional anesthesia team for any further epidural troubleshooting issues.   - the primary concern with a disconnection is that the catheter could occlude or a open catheter can serve as a theoretical conduit for infection should it be exposed. Should the patient disconnect the catheter again, the epidural pump should be stopped, the disconnected part of the catheter should be covered and closed with sterile tegaderm so the open end is no longer open.     Rayomnd Patricia MD  CA3  [phone]

## 2018-11-30 NOTE — PROGRESS NOTES
"Thoracic Surgery Progress Note  11/30/2018    Confused overnight, received haldol. Reports anxiety and shortness of breath. Unable to lie down. Pain controlled on oral medications. Voiding independently. +BM.     /74  Temp 97.7  F (36.5  C) (Oral)  Resp 24  Ht 1.778 m (5' 10\")  Wt 131.5 kg (289 lb 14.5 oz)  SpO2 96%  BMI 41.6 kg/m2    Intake/Output Summary (Last 24 hours) at 11/30/18 1434  Last data filed at 11/30/18 1200   Gross per 24 hour   Intake              580 ml   Output             1865 ml   Net            -1285 ml      UOP 960ml (690ml)  BM 600ml     PE  NAD  NLB   Thoracotomy incision c/d/i, no drainage, chest tube no leak  Abd benign  Ext wwp    Labs    Recent Labs  Lab 11/30/18  0450 11/29/18 2026 11/29/18  0540 11/28/18  0656   WBC 15.2* 12.9*  --  14.8*   HGB 9.9* 10.0*  --  11.0*    239 228 224       Recent Labs  Lab 11/30/18  0450 11/29/18 2026 11/29/18  1109   * 131* 131*   POTASSIUM 4.4 3.9 4.2   CHLORIDE 99 100 102   CO2 22 23 24   BUN 13 16 15   CR 0.77 0.86 0.96   GLC 94 96 93   ISIDORO 8.2* 8.2* 8.3*     CXR 11/30 IMPRESSION:  1. New right-sided hydropneumothorax with right-sided pleural  effusion. Unchanged position of right-sided chest tube.  2. Increasing right-sided airspace and interstitial opacities with  left retrocardiac opacities. Concern for aspiration versus infection  versus pulmonary edema versus ARDS.     [Urgent Result: Hydropneumothorax]    US b/l LE 11/30 IMPRESSION: No evidence of deep venous thrombosis in either lower  extremity.    A/P: 47M 11/27 s/p plication of right diaphragm. Initially aneuric with IVON, now resolved but with persistent hyponatremia. Likely fluid overloaded.     -Bronchoscopy     -PO pain: Tylenol, continue home psych meds, propanolol, zoloft; epidural removed.   -hold home amlodipine, restart if hypertensive  -Lasix 40mg IV BID today  -NPO for procedure, bowel meds   -Hold heparin ppx for procedure   -Dispo: home with 24/7 " supervision/assistnace + return to OP PT/OT    Seen with fellow who discussed with staff.     Art Haji MD  Surgery PGY1
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
## 2018-11-30 NOTE — PLAN OF CARE
Problem: Patient Care Overview  Goal: Plan of Care/Patient Progress Review  PT / 6B -     Discharge Planner PT   Patient plan for discharge: home with 24/7 assist from SO  Current status: Performing sit>stand without assistance.  Ambulated in hallway with no assistive device; demonstrating gait instability however no overt loss of balance.  Ascended/descended 9 stairs with 1 handrail.  Desaturated to 74% on RA during activity; quickly returned to 96% with 4L via NC.  Barriers to return to prior living situation: respiratory status, higher level balance, strength  Recommendations for discharge: home with 24/7 supervision/assistance + return to OP PT/OT  Rationale for recommendations: Hal currently demonstrates all functional mobility without additional assistance. Hal attempts OP PT/OT and plans to return upon discharge to continue progression deficits towards independence with all functional mobility.        Entered by: Rachel Abrams 11/30/2018 1:12 PM

## 2018-11-30 NOTE — PLAN OF CARE
Problem: Patient Care Overview  Goal: Plan of Care/Patient Progress Review  OT 6B: Cancel - pt refusing participation in OT session this AM, will check back as able.

## 2018-12-01 ENCOUNTER — APPOINTMENT (OUTPATIENT)
Dept: PHYSICAL THERAPY | Facility: CLINIC | Age: 48
DRG: 163 | End: 2018-12-01
Attending: THORACIC SURGERY (CARDIOTHORACIC VASCULAR SURGERY)
Payer: COMMERCIAL

## 2018-12-01 ENCOUNTER — APPOINTMENT (OUTPATIENT)
Dept: GENERAL RADIOLOGY | Facility: CLINIC | Age: 48
DRG: 163 | End: 2018-12-01
Attending: THORACIC SURGERY (CARDIOTHORACIC VASCULAR SURGERY)
Payer: COMMERCIAL

## 2018-12-01 LAB
ANION GAP SERPL CALCULATED.3IONS-SCNC: 13 MMOL/L (ref 3–14)
BUN SERPL-MCNC: 14 MG/DL (ref 7–30)
CALCIUM SERPL-MCNC: 8.2 MG/DL (ref 8.5–10.1)
CHLORIDE SERPL-SCNC: 98 MMOL/L (ref 94–109)
CO2 SERPL-SCNC: 24 MMOL/L (ref 20–32)
CREAT SERPL-MCNC: 0.77 MG/DL (ref 0.66–1.25)
ERYTHROCYTE [DISTWIDTH] IN BLOOD BY AUTOMATED COUNT: 14.6 % (ref 10–15)
GFR SERPL CREATININE-BSD FRML MDRD: >90 ML/MIN/1.7M2
GLUCOSE SERPL-MCNC: 92 MG/DL (ref 70–99)
HCT VFR BLD AUTO: 31.1 % (ref 40–53)
HGB BLD-MCNC: 10.2 G/DL (ref 13.3–17.7)
MCH RBC QN AUTO: 32.2 PG (ref 26.5–33)
MCHC RBC AUTO-ENTMCNC: 32.8 G/DL (ref 31.5–36.5)
MCV RBC AUTO: 98 FL (ref 78–100)
PLATELET # BLD AUTO: 267 10E9/L (ref 150–450)
POTASSIUM BLD-SCNC: 3.3 MMOL/L (ref 3.4–5.3)
POTASSIUM SERPL-SCNC: 3.2 MMOL/L (ref 3.4–5.3)
RBC # BLD AUTO: 3.17 10E12/L (ref 4.4–5.9)
SODIUM SERPL-SCNC: 135 MMOL/L (ref 133–144)
WBC # BLD AUTO: 9.8 10E9/L (ref 4–11)

## 2018-12-01 PROCEDURE — 97530 THERAPEUTIC ACTIVITIES: CPT | Mod: GP

## 2018-12-01 PROCEDURE — 25000128 H RX IP 250 OP 636: Performed by: SURGERY

## 2018-12-01 PROCEDURE — 25000125 ZZHC RX 250: Performed by: PHYSICIAN ASSISTANT

## 2018-12-01 PROCEDURE — 97116 GAIT TRAINING THERAPY: CPT | Mod: GP

## 2018-12-01 PROCEDURE — 40000193 ZZH STATISTIC PT WARD VISIT

## 2018-12-01 PROCEDURE — 84132 ASSAY OF SERUM POTASSIUM: CPT | Performed by: THORACIC SURGERY (CARDIOTHORACIC VASCULAR SURGERY)

## 2018-12-01 PROCEDURE — 85027 COMPLETE CBC AUTOMATED: CPT | Performed by: STUDENT IN AN ORGANIZED HEALTH CARE EDUCATION/TRAINING PROGRAM

## 2018-12-01 PROCEDURE — 25000132 ZZH RX MED GY IP 250 OP 250 PS 637: Performed by: SURGERY

## 2018-12-01 PROCEDURE — 97110 THERAPEUTIC EXERCISES: CPT | Mod: GP

## 2018-12-01 PROCEDURE — 25000132 ZZH RX MED GY IP 250 OP 250 PS 637: Performed by: STUDENT IN AN ORGANIZED HEALTH CARE EDUCATION/TRAINING PROGRAM

## 2018-12-01 PROCEDURE — 71046 X-RAY EXAM CHEST 2 VIEWS: CPT

## 2018-12-01 PROCEDURE — 36415 COLL VENOUS BLD VENIPUNCTURE: CPT | Performed by: THORACIC SURGERY (CARDIOTHORACIC VASCULAR SURGERY)

## 2018-12-01 PROCEDURE — 94640 AIRWAY INHALATION TREATMENT: CPT | Mod: 76

## 2018-12-01 PROCEDURE — 12000006 ZZH R&B IMCU INTERMEDIATE UMMC

## 2018-12-01 PROCEDURE — 25000128 H RX IP 250 OP 636: Performed by: THORACIC SURGERY (CARDIOTHORACIC VASCULAR SURGERY)

## 2018-12-01 PROCEDURE — 40000141 ZZH STATISTIC PERIPHERAL IV START W/O US GUIDANCE

## 2018-12-01 PROCEDURE — 94660 CPAP INITIATION&MGMT: CPT

## 2018-12-01 PROCEDURE — 36415 COLL VENOUS BLD VENIPUNCTURE: CPT | Performed by: STUDENT IN AN ORGANIZED HEALTH CARE EDUCATION/TRAINING PROGRAM

## 2018-12-01 PROCEDURE — 40000275 ZZH STATISTIC RCP TIME EA 10 MIN

## 2018-12-01 PROCEDURE — 80048 BASIC METABOLIC PNL TOTAL CA: CPT | Performed by: STUDENT IN AN ORGANIZED HEALTH CARE EDUCATION/TRAINING PROGRAM

## 2018-12-01 PROCEDURE — 94640 AIRWAY INHALATION TREATMENT: CPT

## 2018-12-01 PROCEDURE — 25000128 H RX IP 250 OP 636: Performed by: PHYSICIAN ASSISTANT

## 2018-12-01 RX ORDER — POTASSIUM CL/LIDO/0.9 % NACL 10MEQ/0.1L
10 INTRAVENOUS SOLUTION, PIGGYBACK (ML) INTRAVENOUS
Status: DISCONTINUED | OUTPATIENT
Start: 2018-12-01 | End: 2018-12-03 | Stop reason: HOSPADM

## 2018-12-01 RX ORDER — POTASSIUM CHLORIDE 29.8 MG/ML
20 INJECTION INTRAVENOUS
Status: DISCONTINUED | OUTPATIENT
Start: 2018-12-01 | End: 2018-12-03 | Stop reason: HOSPADM

## 2018-12-01 RX ORDER — POTASSIUM CHLORIDE 1.5 G/1.58G
20-40 POWDER, FOR SOLUTION ORAL
Status: DISCONTINUED | OUTPATIENT
Start: 2018-12-01 | End: 2018-12-03 | Stop reason: HOSPADM

## 2018-12-01 RX ORDER — POTASSIUM CHLORIDE 750 MG/1
20-40 TABLET, EXTENDED RELEASE ORAL
Status: DISCONTINUED | OUTPATIENT
Start: 2018-12-01 | End: 2018-12-03 | Stop reason: HOSPADM

## 2018-12-01 RX ORDER — POTASSIUM CHLORIDE 7.45 MG/ML
10 INJECTION INTRAVENOUS
Status: DISCONTINUED | OUTPATIENT
Start: 2018-12-01 | End: 2018-12-03 | Stop reason: HOSPADM

## 2018-12-01 RX ORDER — CODEINE PHOSPHATE AND GUAIFENESIN 10; 100 MG/5ML; MG/5ML
5 SOLUTION ORAL EVERY 4 HOURS PRN
Status: DISCONTINUED | OUTPATIENT
Start: 2018-12-01 | End: 2018-12-03 | Stop reason: HOSPADM

## 2018-12-01 RX ORDER — MAGNESIUM SULFATE HEPTAHYDRATE 40 MG/ML
4 INJECTION, SOLUTION INTRAVENOUS EVERY 4 HOURS PRN
Status: DISCONTINUED | OUTPATIENT
Start: 2018-12-01 | End: 2018-12-03 | Stop reason: HOSPADM

## 2018-12-01 RX ADMIN — SODIUM CHLORIDE SOLN NEBU 3% 3 ML: 3 NEBU SOLN at 08:06

## 2018-12-01 RX ADMIN — FLUTICASONE PROPIONATE 2 SPRAY: 50 SPRAY, METERED NASAL at 07:55

## 2018-12-01 RX ADMIN — IPRATROPIUM BROMIDE AND ALBUTEROL SULFATE 3 ML: .5; 3 SOLUTION RESPIRATORY (INHALATION) at 08:06

## 2018-12-01 RX ADMIN — ENOXAPARIN SODIUM 40 MG: 40 INJECTION SUBCUTANEOUS at 11:01

## 2018-12-01 RX ADMIN — SENNOSIDES AND DOCUSATE SODIUM 2 TABLET: 8.6; 5 TABLET ORAL at 21:03

## 2018-12-01 RX ADMIN — SENNOSIDES AND DOCUSATE SODIUM 1 TABLET: 8.6; 5 TABLET ORAL at 07:57

## 2018-12-01 RX ADMIN — Medication 7500 UNITS: at 06:17

## 2018-12-01 RX ADMIN — GUAIFENESIN AND CODEINE PHOSPHATE 5 ML: 10; 100 LIQUID ORAL at 03:31

## 2018-12-01 RX ADMIN — GUAIFENESIN AND CODEINE PHOSPHATE 5 ML: 10; 100 LIQUID ORAL at 07:54

## 2018-12-01 RX ADMIN — POTASSIUM CHLORIDE 20 MEQ: 750 TABLET, EXTENDED RELEASE ORAL at 13:25

## 2018-12-01 RX ADMIN — POTASSIUM CHLORIDE 40 MEQ: 750 TABLET, EXTENDED RELEASE ORAL at 09:32

## 2018-12-01 RX ADMIN — SERTRALINE HYDROCHLORIDE 50 MG: 25 TABLET ORAL at 07:57

## 2018-12-01 RX ADMIN — IPRATROPIUM BROMIDE AND ALBUTEROL SULFATE 3 ML: .5; 3 SOLUTION RESPIRATORY (INHALATION) at 20:04

## 2018-12-01 RX ADMIN — FUROSEMIDE 20 MG: 10 INJECTION, SOLUTION INTRAVENOUS at 07:57

## 2018-12-01 RX ADMIN — ACETAMINOPHEN 975 MG: 325 TABLET, FILM COATED ORAL at 21:03

## 2018-12-01 RX ADMIN — GUAIFENESIN AND CODEINE PHOSPHATE 5 ML: 10; 100 LIQUID ORAL at 21:02

## 2018-12-01 RX ADMIN — POTASSIUM CHLORIDE 20 MEQ: 750 TABLET, EXTENDED RELEASE ORAL at 21:02

## 2018-12-01 RX ADMIN — ACETAMINOPHEN 975 MG: 325 TABLET, FILM COATED ORAL at 03:36

## 2018-12-01 RX ADMIN — POTASSIUM CHLORIDE 40 MEQ: 750 TABLET, EXTENDED RELEASE ORAL at 18:50

## 2018-12-01 RX ADMIN — GUAIFENESIN AND CODEINE PHOSPHATE 5 ML: 10; 100 LIQUID ORAL at 16:37

## 2018-12-01 RX ADMIN — SODIUM CHLORIDE SOLN NEBU 3% 3 ML: 3 NEBU SOLN at 12:10

## 2018-12-01 RX ADMIN — IPRATROPIUM BROMIDE AND ALBUTEROL SULFATE 3 ML: .5; 3 SOLUTION RESPIRATORY (INHALATION) at 16:48

## 2018-12-01 RX ADMIN — IPRATROPIUM BROMIDE AND ALBUTEROL SULFATE 3 ML: .5; 3 SOLUTION RESPIRATORY (INHALATION) at 12:10

## 2018-12-01 RX ADMIN — BUSPIRONE HYDROCHLORIDE 5 MG: 5 TABLET ORAL at 07:56

## 2018-12-01 RX ADMIN — ONDANSETRON HYDROCHLORIDE 4 MG: 2 INJECTION, SOLUTION INTRAMUSCULAR; INTRAVENOUS at 10:07

## 2018-12-01 RX ADMIN — GUAIFENESIN AND CODEINE PHOSPHATE 5 ML: 10; 100 LIQUID ORAL at 12:26

## 2018-12-01 RX ADMIN — FUROSEMIDE 20 MG: 10 INJECTION, SOLUTION INTRAVENOUS at 16:25

## 2018-12-01 RX ADMIN — PROPRANOLOL HYDROCHLORIDE 80 MG: 80 CAPSULE, EXTENDED RELEASE ORAL at 07:56

## 2018-12-01 RX ADMIN — SODIUM CHLORIDE SOLN NEBU 3% 3 ML: 3 NEBU SOLN at 16:48

## 2018-12-01 RX ADMIN — SODIUM CHLORIDE SOLN NEBU 3% 3 ML: 3 NEBU SOLN at 20:05

## 2018-12-01 RX ADMIN — ACETAMINOPHEN 975 MG: 325 TABLET, FILM COATED ORAL at 12:21

## 2018-12-01 ASSESSMENT — ACTIVITIES OF DAILY LIVING (ADL)
ADLS_ACUITY_SCORE: 10

## 2018-12-01 NOTE — PLAN OF CARE
"Problem: Patient Care Overview  Goal: Plan of Care/Patient Progress Review  Outcome: No Change  /68 (BP Location: Left arm)  Temp 97  F (36.1  C) (Axillary)  Resp 22  Ht 1.778 m (5' 10\")  Wt 131.5 kg (289 lb 14.5 oz)  SpO2 (!) 84%  BMI 41.6 kg/m2    Neuro: A&Ox3-4.   Cardiac: SR. VSS.   Respiratory: Sating 99% on RA. Pt complains of SOB at times, O2 per pt comfort   GI/: Adequate urine output. BM X1  Diet/appetite: Tolerating full liquid diet.   Activity:  Assist of 1-SBA, up to chair and in halls.  Pain: At acceptable level on current regimen. No oxy given today   Skin: No new deficits noted.  LDA's: PIV- SL     Plan: Continue with POC. Notify primary team with changes. VPM on today, pt call light appropriate    Problem: Chronic Respiratory Difficulty Comorbidity  Goal: Chronic Respiratory Difficulty  Patient comorbidity will be monitored for signs and symptoms of Respiratory Difficulty (Chronic) condition.  Problems will be absent, minimized or managed by discharge/transition of care.   Outcome: No Change  o2 sats stable on RA, pt complaints of intermit SOB, relief with reposition .       "

## 2018-12-01 NOTE — PLAN OF CARE
Problem: Patient Care Overview  Goal: Plan of Care/Patient Progress Review  Pt with stable vitals, CT placed  to water seal this morning, minimal CT output, 02 2L nc with activity,  productive cough decreasing in frequency throughout day, prn guaifenisen given with some effect; one episode nausea, relief with prn zofran; pt tolerated small meals, voiding well; recliner in use, pt up in room and up walking/stair climbing with therapy; Continue to monitor vitals labs respiratory status as per plan of care see also flow sheets for details
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
## 2018-12-01 NOTE — PROGRESS NOTES
"Thoracic Surgery Progress Note  12/01/2018    Confusion improved along with anxiety.  Sitting in chair, comfortable    /68 (BP Location: Right arm)  Temp 98.1  F (36.7  C) (Oral)  Resp 20  Ht 1.778 m (5' 10\")  Wt 126.9 kg (279 lb 12.2 oz)  SpO2 92%  BMI 40.14 kg/m2    Intake/Output Summary (Last 24 hours) at 11/30/18 1434  Last data filed at 11/30/18 1200   Gross per 24 hour   Intake              580 ml   Output             1865 ml   Net            -1285 ml      UOP 2.5L    PE  NAD  NLB   Thoracotomy incision c/d/i, no drainage, chest tube no leak  Abd benign  Ext wwp    Labs    Recent Labs  Lab 12/01/18 0523 11/30/18 0450 11/29/18 2026   WBC 9.8 15.2* 12.9*   HGB 10.2* 9.9* 10.0*    239 239       Recent Labs  Lab 12/01/18 0523 11/30/18 0450 11/29/18 2026    131* 131*   POTASSIUM 3.2* 4.4 3.9   CHLORIDE 98 99 100   CO2 24 22 23   BUN 14 13 16   CR 0.77 0.77 0.86   GLC 92 94 96   ISIDORO 8.2* 8.2* 8.2*     CXR 11/30 IMPRESSION:  1. New right-sided hydropneumothorax with right-sided pleural  effusion. Unchanged position of right-sided chest tube.  2. Increasing right-sided airspace and interstitial opacities with  left retrocardiac opacities. Concern for aspiration versus infection  versus pulmonary edema versus ARDS.     [Urgent Result: Hydropneumothorax]    US b/l LE 11/30 IMPRESSION: No evidence of deep venous thrombosis in either lower  extremity.    A/P: 47M 11/27 s/p plication of right diaphragm. Initially aneuric with IVON, now resolved but with persistent hyponatremia. Likely fluid overloaded.     Acute pulmonary edema  -Bronchoscopy, clear secretions, 11/30   -PO pain: Tylenol, continue home psych meds, propanolol, zoloft  -hold home amlodipine, restart if hypertensive  -Lasix 20mg BID scheduled   - repeat chest xray today, pulmonary edema, right worse than left  -regular diet  -lovenox    [facility]  Thoracic Surgery Fellow  [phone]      "

## 2018-12-01 NOTE — PROGRESS NOTES
STAFF ADDENDUM:  I saw and evaluated Mr. Clark and agree with the resident s findings and plan of care as documented in the resident s note and edited by me, as applicable.      In summary, Mr. Clark is improving slowly. We switched chest tube to waterseal today and will monitor hydropneumothorax.   The patient had all questions answered and was in agreement with the plan.  Kolton Robbins MD

## 2018-12-01 NOTE — PLAN OF CARE
Problem: Patient Care Overview  Goal: Plan of Care/Patient Progress Review  Outcome: Therapy, progress toward functional goals is gradual  Neuro: A&Ox3.  Not to situation forgets about chest tubes and will get up impulsively to go to bathroom and drag CT with him.  Needs reminders, very approachable over night.  Pt restless first part of shift up till 11 pm.  Slept most of rest of shift with minimal interruptions except vitals and pt needing to go to the bathroom.  Cardiac: SR 70-80's. VSS.   Respiratory: Sating >93% on RA this shift.  Pt does get dyspnea while walking from bed to bathroom.  Strong cough and good gag reflex, cross cover MD ordered cough syrup, given once.  GI/: Adequate urine output and BM X1, in bathroom.  Diet/appetite: Tolerating NPO over night.  Activity:  Standby assist up to chair and in halls.  Pt unable to lay in bed even with HOB up.  Sat up in recliner chair over night with legs elevated  .  Pain: At acceptable level on current regimen. Tylenol for pain control schedule.  Skin: No new deficits noted.    Plan: Continue with POC. Notify primary team with changes.    Problem: Chronic Respiratory Difficulty Comorbidity  Goal: Chronic Respiratory Difficulty  Patient comorbidity will be monitored for signs and symptoms of Respiratory Difficulty (Chronic) condition.  Problems will be absent, minimized or managed by discharge/transition of care.   Outcome: Therapy, progress towards functional goals is fair  Pt is on RA SaO2  >93% this shift, does get dyspnea walking to and from bathroom this shift.  Pt is unable to lay in bed.  Pt is back and forth to bed and chair numerous unable to get comfortable first 4 hours of this shift.  Pt has CT in to LIS minium  output over night

## 2018-12-01 NOTE — PROVIDER NOTIFICATION
Time of notification: 9:54 PM  Provider notified:Richmond Garcia MD paged via text [phone].  Pt triggered lactic acid conditional orders.  Patient status:Vitals are stable.  Temp:  [97  F (36.1  C)-97.9  F (36.6  C)] 97.3  F (36.3  C)  Heart Rate:  [78-84] 84  Resp:  [8-25] 22  BP: ()/(68-86) 105/75  SpO2:  [84 %-100 %] 84 %  Orders received: Lab and vitals signs ordered per conditional orders.

## 2018-12-01 NOTE — PLAN OF CARE
Problem: Patient Care Overview  Goal: Plan of Care/Patient Progress Review  PT / 6B -     Discharge Planner PT   Patient plan for discharge: home with 24/7 supervision/assistance  Current status: On 2L via NC. Transferred sit>stand without additional assistance. Ambulated without assistance device with no overt loss of balance.  Ascended/descended 9 stairs, desaturated to 84% however quickly returned to >93% with seated rest.  Engaged in LE strengthening.   Barriers to return to prior living situation: cognition, higher level balance, respiratory status  Recommendations for discharge: home with 24/7 supervision/assistance and resumption of OP PT/OT  Rationale for recommendations: Hal requires continued skilled OP PT/OT to address balance, strength, endurance and cognition.        Entered by: Rachel Abrams 12/01/2018 1:17 PM

## 2018-12-01 NOTE — PROGRESS NOTES
Nephrology        Patient doing well  Creatinine down to baseline  Great UOP with lasix, almost back to admission weight  - can stop furosemide or one more dose to get his down another 1 liter, clinically stable    We will sign off    Radha Emery

## 2018-12-02 ENCOUNTER — APPOINTMENT (OUTPATIENT)
Dept: PHYSICAL THERAPY | Facility: CLINIC | Age: 48
DRG: 163 | End: 2018-12-02
Attending: THORACIC SURGERY (CARDIOTHORACIC VASCULAR SURGERY)
Payer: COMMERCIAL

## 2018-12-02 ENCOUNTER — APPOINTMENT (OUTPATIENT)
Dept: GENERAL RADIOLOGY | Facility: CLINIC | Age: 48
DRG: 163 | End: 2018-12-02
Attending: THORACIC SURGERY (CARDIOTHORACIC VASCULAR SURGERY)
Payer: COMMERCIAL

## 2018-12-02 ENCOUNTER — APPOINTMENT (OUTPATIENT)
Dept: OCCUPATIONAL THERAPY | Facility: CLINIC | Age: 48
DRG: 163 | End: 2018-12-02
Attending: THORACIC SURGERY (CARDIOTHORACIC VASCULAR SURGERY)
Payer: COMMERCIAL

## 2018-12-02 LAB
ANION GAP SERPL CALCULATED.3IONS-SCNC: 10 MMOL/L (ref 3–14)
BACTERIA SPEC CULT: NORMAL
BUN SERPL-MCNC: 17 MG/DL (ref 7–30)
CALCIUM SERPL-MCNC: 8.6 MG/DL (ref 8.5–10.1)
CHLORIDE SERPL-SCNC: 100 MMOL/L (ref 94–109)
CO2 SERPL-SCNC: 25 MMOL/L (ref 20–32)
CREAT SERPL-MCNC: 0.9 MG/DL (ref 0.66–1.25)
ERYTHROCYTE [DISTWIDTH] IN BLOOD BY AUTOMATED COUNT: 15.2 % (ref 10–15)
GFR SERPL CREATININE-BSD FRML MDRD: 90 ML/MIN/1.7M2
GLUCOSE SERPL-MCNC: 96 MG/DL (ref 70–99)
HCT VFR BLD AUTO: 33.6 % (ref 40–53)
HGB BLD-MCNC: 11 G/DL (ref 13.3–17.7)
MCH RBC QN AUTO: 32.4 PG (ref 26.5–33)
MCHC RBC AUTO-ENTMCNC: 32.7 G/DL (ref 31.5–36.5)
MCV RBC AUTO: 99 FL (ref 78–100)
PLATELET # BLD AUTO: 310 10E9/L (ref 150–450)
POTASSIUM SERPL-SCNC: 3.6 MMOL/L (ref 3.4–5.3)
RBC # BLD AUTO: 3.4 10E12/L (ref 4.4–5.9)
SODIUM SERPL-SCNC: 135 MMOL/L (ref 133–144)
SPECIMEN SOURCE: NORMAL
WBC # BLD AUTO: 10.1 10E9/L (ref 4–11)

## 2018-12-02 PROCEDURE — 85027 COMPLETE CBC AUTOMATED: CPT | Performed by: STUDENT IN AN ORGANIZED HEALTH CARE EDUCATION/TRAINING PROGRAM

## 2018-12-02 PROCEDURE — 25000132 ZZH RX MED GY IP 250 OP 250 PS 637: Performed by: SURGERY

## 2018-12-02 PROCEDURE — 71046 X-RAY EXAM CHEST 2 VIEWS: CPT

## 2018-12-02 PROCEDURE — 40000193 ZZH STATISTIC PT WARD VISIT

## 2018-12-02 PROCEDURE — 40000133 ZZH STATISTIC OT WARD VISIT: Performed by: OCCUPATIONAL THERAPIST

## 2018-12-02 PROCEDURE — 97535 SELF CARE MNGMENT TRAINING: CPT | Mod: GO | Performed by: OCCUPATIONAL THERAPIST

## 2018-12-02 PROCEDURE — 36415 COLL VENOUS BLD VENIPUNCTURE: CPT | Performed by: STUDENT IN AN ORGANIZED HEALTH CARE EDUCATION/TRAINING PROGRAM

## 2018-12-02 PROCEDURE — 97530 THERAPEUTIC ACTIVITIES: CPT | Mod: GP

## 2018-12-02 PROCEDURE — 40000274 ZZH STATISTIC RCP CONSULT EA 30 MIN

## 2018-12-02 PROCEDURE — 25000128 H RX IP 250 OP 636: Performed by: SURGERY

## 2018-12-02 PROCEDURE — 25000132 ZZH RX MED GY IP 250 OP 250 PS 637: Performed by: STUDENT IN AN ORGANIZED HEALTH CARE EDUCATION/TRAINING PROGRAM

## 2018-12-02 PROCEDURE — 80048 BASIC METABOLIC PNL TOTAL CA: CPT | Performed by: STUDENT IN AN ORGANIZED HEALTH CARE EDUCATION/TRAINING PROGRAM

## 2018-12-02 PROCEDURE — 97116 GAIT TRAINING THERAPY: CPT | Mod: GP

## 2018-12-02 PROCEDURE — 12000006 ZZH R&B IMCU INTERMEDIATE UMMC

## 2018-12-02 RX ORDER — SODIUM CHLORIDE FOR INHALATION 3 %
3 VIAL, NEBULIZER (ML) INHALATION EVERY 4 HOURS PRN
Status: DISCONTINUED | OUTPATIENT
Start: 2018-12-02 | End: 2018-12-03 | Stop reason: HOSPADM

## 2018-12-02 RX ORDER — IPRATROPIUM BROMIDE AND ALBUTEROL SULFATE 2.5; .5 MG/3ML; MG/3ML
3 SOLUTION RESPIRATORY (INHALATION) EVERY 4 HOURS PRN
Status: DISCONTINUED | OUTPATIENT
Start: 2018-12-02 | End: 2018-12-03 | Stop reason: HOSPADM

## 2018-12-02 RX ADMIN — GUAIFENESIN AND CODEINE PHOSPHATE 5 ML: 10; 100 LIQUID ORAL at 13:23

## 2018-12-02 RX ADMIN — ENOXAPARIN SODIUM 40 MG: 40 INJECTION SUBCUTANEOUS at 11:06

## 2018-12-02 RX ADMIN — ACETAMINOPHEN 975 MG: 325 TABLET, FILM COATED ORAL at 20:58

## 2018-12-02 RX ADMIN — SERTRALINE HYDROCHLORIDE 50 MG: 25 TABLET ORAL at 08:48

## 2018-12-02 RX ADMIN — GUAIFENESIN AND CODEINE PHOSPHATE 5 ML: 10; 100 LIQUID ORAL at 04:52

## 2018-12-02 RX ADMIN — ACETAMINOPHEN 975 MG: 325 TABLET, FILM COATED ORAL at 04:53

## 2018-12-02 RX ADMIN — Medication 1 MG: at 22:56

## 2018-12-02 RX ADMIN — ACETAMINOPHEN 975 MG: 325 TABLET, FILM COATED ORAL at 13:23

## 2018-12-02 RX ADMIN — POTASSIUM CHLORIDE 20 MEQ: 750 TABLET, EXTENDED RELEASE ORAL at 16:01

## 2018-12-02 RX ADMIN — GUAIFENESIN AND CODEINE PHOSPHATE 5 ML: 10; 100 LIQUID ORAL at 22:56

## 2018-12-02 RX ADMIN — FLUTICASONE PROPIONATE 2 SPRAY: 50 SPRAY, METERED NASAL at 08:48

## 2018-12-02 RX ADMIN — GUAIFENESIN AND CODEINE PHOSPHATE 5 ML: 10; 100 LIQUID ORAL at 18:06

## 2018-12-02 RX ADMIN — PROPRANOLOL HYDROCHLORIDE 80 MG: 80 CAPSULE, EXTENDED RELEASE ORAL at 08:48

## 2018-12-02 RX ADMIN — BUSPIRONE HYDROCHLORIDE 5 MG: 5 TABLET ORAL at 08:48

## 2018-12-02 ASSESSMENT — ACTIVITIES OF DAILY LIVING (ADL)
ADLS_ACUITY_SCORE: 10

## 2018-12-02 ASSESSMENT — PAIN DESCRIPTION - DESCRIPTORS: DESCRIPTORS: ACHING

## 2018-12-02 NOTE — PLAN OF CARE
Problem: Patient Care Overview  Goal: Plan of Care/Patient Progress Review  Discharge Planner OT   Patient plan for discharge: Home with 24/7 supervision and OP OT/PT  Current status: Pt demonstrated significantly improved performance and safety awareness today - able to complete toileting and ADLs at sink with mostly SBA.  Barriers to return to prior living situation: pain, surgical precautions, cognitive deficits, weakness  Recommendations for discharge: Home with 24/7 supervision and OP OT/PT  Rationale for recommendations: to promote safety and (I) with ADL/IADLs       Entered by: Hilda Oneill 12/02/2018 3:19 PM

## 2018-12-02 NOTE — PLAN OF CARE
Problem: Patient Care Overview  Goal: Plan of Care/Patient Progress Review  Outcome: Therapy, progress towards functional goals is fair  Neuro: A&Ox4. Pt is very impulsive at times and forgets to call for help, also forgets the chest tube.  Pt is currently on VPM, is not redirectable with VPM, so alarm is set off.  Pt is redirectable when a person is the room with him.  Cardiac: SR70-80 NSR.   VSS.   Respiratory: Sating >93 %  on RA.  No dsypnea noted ambulating to and from chair to bathroom.  Pt does have an infrequent cough and is using   GI/: Adequate urine output  In hat on toilet. BM X 2 loose and moderate this shift.  Diet/appetite: Tolerating Regular diet. Eating well.  Activity:  Assist of one standby, up to chair and in halls.  Pain: At acceptable level on current regimen. Tylenol scheduled.  Skin: No new deficits noted.      Plan: Continue with POC. Notify primary team with changes.      Problem: Chronic Respiratory Difficulty Comorbidity  Goal: Chronic Respiratory Difficulty  Patient comorbidity will be monitored for signs and symptoms of Respiratory Difficulty (Chronic) condition.  Problems will be absent, minimized or managed by discharge/transition of care.   Outcome: Therapy, progress towards functional goals is fair  Pt is RA with SaO2 is >93% all shift.  No report of dyspnea this shift.  Pt has Rt CT in to water seal  With no 5 ml out put this shift in CT tube  Will continue to montior and f/u on pt conditon..

## 2018-12-02 NOTE — PROGRESS NOTES
STAFF ADDENDUM:  I saw and evaluated Mr. Clark and agree with the resident s findings and plan of care as documented in the resident s note and edited by me, as applicable.      In summary, Mr. Clark looks and feels better today. Stable or slightly decrease hydropneumothorax. We plan to remove chest tube tomorrow.  The patient had all questions answered and was in agreement with the plan.  Kolton Robbins MD

## 2018-12-02 NOTE — PLAN OF CARE
Problem: Patient Care Overview  Goal: Plan of Care/Patient Progress Review  Outcome: No Change  NSR. IV Lasix given as ordered. Adequate UOP. K+ recheck 3.3; replacement protocol started, pt needing more K+ this evening. CT remains in place to H2O seal. Continues with cough. PRN Guiafensin given x1. Pt up in recliner. VPM in place for safety. Continue with plan of care and report changes to treatment team.

## 2018-12-02 NOTE — PLAN OF CARE
Problem: Patient Care Overview  Goal: Plan of Care/Patient Progress Review  Outcome: No Change     Neuro: A&Ox3-4.   Cardiac: SR, slightly tachy/mostly 90s today. VSS.                 Respiratory: Sating 99% on RA. Bit SOB with activity.  GI/: Adequate urine output, tea-colored (MD notified. Not reflected in previous charting, but pt states has been dark. Discussed with Dr. Robbins at end of shift, continue to monitor & we'll check labs in the morning). BM x3 (two charted, one per pt statement), formed/not loose, brown.  Diet/appetite: Regular diet, poor appetite.   Activity:  Assist of 1-SBA, up to chair and in halls.  Pain: At acceptable level on current regimen. Scheduled Tylenol only today.   Skin: No new deficits noted.  LDA's: PIV- SL. R chest tube to H2O seal, no new output.     Plan: Continue with POC. Notify primary team with changes. VPM continues, pt calm, cooperative, uses call light appropriately.     Problem: Chronic Respiratory Difficulty Comorbidity  Goal: Chronic Respiratory Difficulty  Patient comorbidity will be monitored for signs and symptoms of Respiratory Difficulty (Chronic) condition.  Problems will be absent, minimized or managed by discharge/transition of care.   Outcome: No Change  O2 sats stable on RA. Robitussin PRN x1.

## 2018-12-02 NOTE — PROGRESS NOTES
"Thoracic Surgery Progress Note  12/02/2018    Intermittent confusion lingers. Sitting in chair, comfortable. Stable on RA.     /85 (BP Location: Left arm)  Temp 97.8  F (36.6  C) (Oral)  Resp 22  Ht 1.778 m (5' 10\")  Wt 125.4 kg (276 lb 7.3 oz)  SpO2 96%  BMI 39.67 kg/m2    Intake/Output Summary (Last 24 hours) at 11/30/18 1434  Last data filed at 11/30/18 1200   Gross per 24 hour   Intake              580 ml   Output             1865 ml   Net            -1285 ml      UOP 2L, 400 since midnight  Stool x2  Chest tube 16    PE  NAD  NLB   Thoracotomy incision c/d/i, no drainage, chest tube no leak  Abd benign  Ext wwp    Labs    Recent Labs  Lab 12/02/18  0538 12/01/18  0523 11/30/18  0450   WBC 10.1 9.8 15.2*   HGB 11.0* 10.2* 9.9*    267 239       Recent Labs  Lab 12/02/18  0538 12/01/18  1749 12/01/18  0523 11/30/18  0450     --  135 131*   POTASSIUM 3.6 3.3* 3.2* 4.4   CHLORIDE 100  --  98 99   CO2 25  --  24 22   BUN 17  --  14 13   CR 0.90  --  0.77 0.77   GLC 96  --  92 94   ISIDORO 8.6  --  8.2* 8.2*          A/P: 47M 11/27 s/p plication of right diaphragm. Initially aneuric with IVON, now resolved but with persistent hyponatremia. Likely fluid overloaded. Bronchoscopy with clear secretions done 11/30.     -PO pain: Tylenol, continue home psych meds, propanolol, zoloft, will discuss seroquel  -hold home amlodipine, restart if hypertensive  -Lasix 20mg BID scheduled, still positive fluid balance  -regular diet  -lovenox  -encourage ambulation    Seen with fellow, will discuss with staff.    Maryse Fraire MD PGY3  General Surgery      "

## 2018-12-02 NOTE — PLAN OF CARE
Problem: Patient Care Overview  Goal: Plan of Care/Patient Progress Review  PT / 6B -     Discharge Planner PT   Patient plan for discharge: home with 24/7 assistance  Current status: Transferred sit>stand + SBA/MOD I.  Ambulated to therapy gym with no assistive device and no overt loss of balance. Climbed 15 stairs with unilateral handrail + MOD I on RA, desaturated to 88% however quickly returned to > 93% on RA with seated rest break.   Barriers to return to prior living situation: cognition, higher level balance and endurance  Recommendations for discharge: home with 24/7 assistance + OP PT/OT  Rationale for recommendations: Hal has demonstrating improvement with overall balance and endurance; currently performing all functional mobility MOD I.  Recommend return to continued skilled OP PT/OT at discharge.        Entered by: Rachel Abrams 12/02/2018 11:32 AM

## 2018-12-03 ENCOUNTER — APPOINTMENT (OUTPATIENT)
Dept: GENERAL RADIOLOGY | Facility: CLINIC | Age: 48
DRG: 163 | End: 2018-12-03
Attending: THORACIC SURGERY (CARDIOTHORACIC VASCULAR SURGERY)
Payer: COMMERCIAL

## 2018-12-03 ENCOUNTER — APPOINTMENT (OUTPATIENT)
Dept: PHYSICAL THERAPY | Facility: CLINIC | Age: 48
DRG: 163 | End: 2018-12-03
Attending: THORACIC SURGERY (CARDIOTHORACIC VASCULAR SURGERY)
Payer: COMMERCIAL

## 2018-12-03 VITALS
OXYGEN SATURATION: 97 % | WEIGHT: 276.46 LBS | BODY MASS INDEX: 39.58 KG/M2 | DIASTOLIC BLOOD PRESSURE: 81 MMHG | RESPIRATION RATE: 20 BRPM | SYSTOLIC BLOOD PRESSURE: 128 MMHG | HEIGHT: 70 IN | TEMPERATURE: 98.1 F

## 2018-12-03 LAB
ANION GAP SERPL CALCULATED.3IONS-SCNC: 8 MMOL/L (ref 3–14)
BUN SERPL-MCNC: 16 MG/DL (ref 7–30)
CALCIUM SERPL-MCNC: 8.2 MG/DL (ref 8.5–10.1)
CHLORIDE SERPL-SCNC: 100 MMOL/L (ref 94–109)
CO2 SERPL-SCNC: 27 MMOL/L (ref 20–32)
CREAT SERPL-MCNC: 0.84 MG/DL (ref 0.66–1.25)
ERYTHROCYTE [DISTWIDTH] IN BLOOD BY AUTOMATED COUNT: 15.2 % (ref 10–15)
GFR SERPL CREATININE-BSD FRML MDRD: >90 ML/MIN/1.7M2
GLUCOSE SERPL-MCNC: 105 MG/DL (ref 70–99)
HCT VFR BLD AUTO: 31 % (ref 40–53)
HGB BLD-MCNC: 10 G/DL (ref 13.3–17.7)
MAGNESIUM SERPL-MCNC: 1.6 MG/DL (ref 1.6–2.3)
MCH RBC QN AUTO: 31.8 PG (ref 26.5–33)
MCHC RBC AUTO-ENTMCNC: 32.3 G/DL (ref 31.5–36.5)
MCV RBC AUTO: 99 FL (ref 78–100)
PHOSPHATE SERPL-MCNC: 2.8 MG/DL (ref 2.5–4.5)
PLATELET # BLD AUTO: 265 10E9/L (ref 150–450)
POTASSIUM SERPL-SCNC: 3.5 MMOL/L (ref 3.4–5.3)
RBC # BLD AUTO: 3.14 10E12/L (ref 4.4–5.9)
SODIUM SERPL-SCNC: 135 MMOL/L (ref 133–144)
WBC # BLD AUTO: 8.5 10E9/L (ref 4–11)

## 2018-12-03 PROCEDURE — 25000132 ZZH RX MED GY IP 250 OP 250 PS 637: Performed by: SURGERY

## 2018-12-03 PROCEDURE — 25000128 H RX IP 250 OP 636: Performed by: THORACIC SURGERY (CARDIOTHORACIC VASCULAR SURGERY)

## 2018-12-03 PROCEDURE — 25000128 H RX IP 250 OP 636: Performed by: SURGERY

## 2018-12-03 PROCEDURE — 25000132 ZZH RX MED GY IP 250 OP 250 PS 637: Performed by: STUDENT IN AN ORGANIZED HEALTH CARE EDUCATION/TRAINING PROGRAM

## 2018-12-03 PROCEDURE — 80048 BASIC METABOLIC PNL TOTAL CA: CPT | Performed by: STUDENT IN AN ORGANIZED HEALTH CARE EDUCATION/TRAINING PROGRAM

## 2018-12-03 PROCEDURE — 97116 GAIT TRAINING THERAPY: CPT | Mod: GP

## 2018-12-03 PROCEDURE — 40000193 ZZH STATISTIC PT WARD VISIT

## 2018-12-03 PROCEDURE — 36415 COLL VENOUS BLD VENIPUNCTURE: CPT | Performed by: STUDENT IN AN ORGANIZED HEALTH CARE EDUCATION/TRAINING PROGRAM

## 2018-12-03 PROCEDURE — 71045 X-RAY EXAM CHEST 1 VIEW: CPT

## 2018-12-03 PROCEDURE — 84100 ASSAY OF PHOSPHORUS: CPT | Performed by: STUDENT IN AN ORGANIZED HEALTH CARE EDUCATION/TRAINING PROGRAM

## 2018-12-03 PROCEDURE — 83735 ASSAY OF MAGNESIUM: CPT | Performed by: STUDENT IN AN ORGANIZED HEALTH CARE EDUCATION/TRAINING PROGRAM

## 2018-12-03 PROCEDURE — 85027 COMPLETE CBC AUTOMATED: CPT | Performed by: STUDENT IN AN ORGANIZED HEALTH CARE EDUCATION/TRAINING PROGRAM

## 2018-12-03 PROCEDURE — 97750 PHYSICAL PERFORMANCE TEST: CPT | Mod: GP

## 2018-12-03 RX ORDER — ALPRAZOLAM 1 MG
1 TABLET ORAL
Status: DISCONTINUED | OUTPATIENT
Start: 2018-12-03 | End: 2018-12-03 | Stop reason: HOSPADM

## 2018-12-03 RX ORDER — HYDROXYZINE HYDROCHLORIDE 25 MG/1
25 TABLET, FILM COATED ORAL EVERY 6 HOURS PRN
Status: DISCONTINUED | OUTPATIENT
Start: 2018-12-03 | End: 2018-12-03 | Stop reason: HOSPADM

## 2018-12-03 RX ORDER — FUROSEMIDE 10 MG/ML
20 INJECTION INTRAMUSCULAR; INTRAVENOUS EVERY 12 HOURS
Status: DISCONTINUED | OUTPATIENT
Start: 2018-12-03 | End: 2018-12-03 | Stop reason: HOSPADM

## 2018-12-03 RX ORDER — HYDROXYZINE HYDROCHLORIDE 25 MG/1
50 TABLET, FILM COATED ORAL EVERY 6 HOURS PRN
Status: DISCONTINUED | OUTPATIENT
Start: 2018-12-03 | End: 2018-12-03 | Stop reason: HOSPADM

## 2018-12-03 RX ORDER — MAGNESIUM SULFATE HEPTAHYDRATE 40 MG/ML
2 INJECTION, SOLUTION INTRAVENOUS DAILY PRN
Status: DISCONTINUED | OUTPATIENT
Start: 2018-12-03 | End: 2018-12-03 | Stop reason: HOSPADM

## 2018-12-03 RX ADMIN — PROPRANOLOL HYDROCHLORIDE 80 MG: 80 CAPSULE, EXTENDED RELEASE ORAL at 09:35

## 2018-12-03 RX ADMIN — MAGNESIUM SULFATE IN WATER 2 G: 40 INJECTION, SOLUTION INTRAVENOUS at 11:39

## 2018-12-03 RX ADMIN — POTASSIUM CHLORIDE 20 MEQ: 750 TABLET, EXTENDED RELEASE ORAL at 09:46

## 2018-12-03 RX ADMIN — GUAIFENESIN AND CODEINE PHOSPHATE 5 ML: 10; 100 LIQUID ORAL at 09:46

## 2018-12-03 RX ADMIN — FLUTICASONE PROPIONATE 2 SPRAY: 50 SPRAY, METERED NASAL at 09:35

## 2018-12-03 RX ADMIN — ACETAMINOPHEN 975 MG: 325 TABLET, FILM COATED ORAL at 12:52

## 2018-12-03 RX ADMIN — SERTRALINE HYDROCHLORIDE 50 MG: 25 TABLET ORAL at 09:35

## 2018-12-03 RX ADMIN — ENOXAPARIN SODIUM 40 MG: 40 INJECTION SUBCUTANEOUS at 09:34

## 2018-12-03 RX ADMIN — GUAIFENESIN AND CODEINE PHOSPHATE 5 ML: 10; 100 LIQUID ORAL at 04:33

## 2018-12-03 RX ADMIN — FUROSEMIDE 20 MG: 10 INJECTION, SOLUTION INTRAVENOUS at 09:34

## 2018-12-03 RX ADMIN — BUSPIRONE HYDROCHLORIDE 5 MG: 5 TABLET ORAL at 09:35

## 2018-12-03 ASSESSMENT — ACTIVITIES OF DAILY LIVING (ADL)
ADLS_ACUITY_SCORE: 10

## 2018-12-03 NOTE — PROGRESS NOTES
"This writer offered pt PRN hydroxyzine to help him relax and get to sleep. Patient refuses the medication at this time stating, \"I want to talk to them first\" referring to his primary medical team.   "

## 2018-12-03 NOTE — PLAN OF CARE
Problem: Patient Care Overview  Goal: Plan of Care/Patient Progress Review  Outcome: Therapy, progress towards functional goals is fair  Neuro: A&Ox4. Is still frustrated over night being on VPM, does forget about CT and walks off without picking it up thus pulling at line.  Pt is also having difficulty sleep last night and had to be reoriented, showing signs of delirium and irrational thinking. Able to get pt to relax and lay in bed.  Cardiac: SR  In 90's all night. VSS.   GI/: Adequate urine output. BM X1  Diet/appetite: Tolerating Regular diet. Eating well.  Activity:  Assist of standby, up to chair and in halls.  Pain: At acceptable level on current regimen.   Skin: No new deficits noted.    Plan: Continue with POC. Notify primary team with changes.      Problem: Chronic Respiratory Difficulty Comorbidity  Goal: Chronic Respiratory Difficulty  Patient comorbidity will be monitored for signs and symptoms of Respiratory Difficulty (Chronic) condition.  Problems will be absent, minimized or managed by discharge/transition of care.   Outcome: Therapy, progress toward functional goals is gradual  Pt has CT in right flank, minimum output over night, no air leak noted.  Lung are clear and dimished through out.  Pt is on RA SaO2 is > 92% over night.  Pt has infrequent cough given cough syrup per MD order over night x2.

## 2018-12-03 NOTE — PLAN OF CARE
Problem: Patient Care Overview  Goal: Plan of Care/Patient Progress Review  Discharge Planner PT   Patient plan for discharge: home with A from [facility]  Current status:  VSS on RA.  Ambulates >1000ft with rest break every 300-400ft d/t fatigue.  Rest breaks x3-4 min.  No LOB.  Ascends and descends 15 steps with single railing and SBAx1.    Barriers to return to prior living situation: none  Recommendations for discharge: home with 24hr A from [facility]  Rationale for recommendations: pt minimally cognitively impaired which may create unsafe situation at home.  Defer to OT for cognitive impairments.         Entered by: Lamberto Mascorro 12/03/2018 11:36 AM        Physical Therapy Discharge Summary    Reason for therapy discharge:    Discharged to home with outpatient therapy.    Progress towards therapy goal(s). See goals on Care Plan in [facility] electronic health record for goal details.  Goals met    Therapy recommendation(s):    Continued therapy is recommended.  Rationale/Recommendations:  OP PT in order to increase functional mobility and high level balance activities.

## 2018-12-03 NOTE — PROVIDER NOTIFICATION
Time of notification: 3:26 AM  Provider notified:Krish cruz MD.  Pt is restless, has delirum tonight after midnight.  Pt states he is exhausted not sleeping well.  Tired repostioning a number of times, pt just cant get comfortable.  Another RN sat with pt for 15 minutes and was able to deescalate pt by conversing and   Patient status:  Temp:  [97.3  F (36.3  C)-98.2  F (36.8  C)] 97.3  F (36.3  C)  Heart Rate:  [] 94  Resp:  [16-22] 18  BP: (103-115)/(65-85) 115/67  SpO2:  [94 %-97 %] 96 %  Orders received:  o

## 2018-12-03 NOTE — PLAN OF CARE
Problem: Patient Care Overview  Goal: Plan of Care/Patient Progress Review  Occupational Therapy Discharge Summary    Reason for therapy discharge:    Discharged to home with outpatient therapy.    Progress towards therapy goal(s). See goals on Care Plan in [facility] electronic health record for goal details.  Goals partially met.  Barriers to achieving goals:   discharge from facility.    Therapy recommendation(s):    Continued therapy is recommended.  Rationale/Recommendations:  Rec home with 24hr supervision for safety with ADLs/IADLs and OP OT/PT to address cognitive impairment, generalized weakness, and balance deficits leading to decreased IND and safety with ADLs. .

## 2018-12-03 NOTE — PROGRESS NOTES
"Thoracic Surgery Progress Note  12/03/2018    S: Per cross cover, remained confused and agitated overnight. Per patient, he slept better last night and was able to get a few hours of sleep. Ambulated independently yesterday. Tolerating diet. Boost supplements encouraged at bedside. Reports back soreness but otherwise good pain control. Patient thinks his breathing has improved, pain is tolerable. No additional concerns. BM x2 overnight.    /81  Temp 98.1  F (36.7  C) (Oral)  Resp 20  Ht 1.778 m (5' 10\")  Wt 125.4 kg (276 lb 7.3 oz)  SpO2 97%  BMI 39.67 kg/m2    Intake/Output Summary (Last 24 hours) at 12/03/18 1306  Last data filed at 12/03/18 0500   Gross per 24 hour   Intake              960 ml   Output              445 ml   Net              515 ml     PE  General: NAD, alert and oriented. Calm and cooperative with exam.  Respiratory: Breathing unlabored on room air.  Thoracotomy incision c/d/i, no drainage, chest tube no leak  Abdomen: Soft, nontender, nondistended  Ext: wwp    Labs    Recent Labs  Lab 12/03/18  0503 12/02/18  0538 12/01/18  0523   WBC 8.5 10.1 9.8   HGB 10.0* 11.0* 10.2*    310 267       Recent Labs  Lab 12/03/18  0503 12/02/18  0538 12/01/18  1749 12/01/18  0523    135  --  135   POTASSIUM 3.5 3.6 3.3* 3.2*   CHLORIDE 100 100  --  98   CO2 27 25  --  24   BUN 16 17  --  14   CR 0.84 0.90  --  0.77   * 96  --  92   ISIDORO 8.2* 8.6  --  8.2*   MAG 1.6  --   --   --    PHOS 2.8  --   --   --      Imaging:  CXR 2 views 12/3:  IMPRESSION:  1.  Stable right hydropneumothorax with unchanged position of  right-sided basilar chest tube.   2.  Slight increase in right mid/lower mixed interstitial and airspace  opacities, which may represent infection superimposed on atelectasis.       A/P: 47M 11/27 s/p plication of right diaphragm. Initially aneuric with IVON, now resolved but with persistent hyponatremia. Likely fluid overloaded. Bronchoscopy with clear secretions done " 11/30. Patient feeling well today, thinks his breathing has improved and he feels well overall. Stable labs and vitals.     -PO pain: Tylenol, continue home psych meds, propanolol, zoloft - consider seroquel if agitation continues  -hold home amlodipine, restart if hypertensive  -Lasix 20mg, change from BID to daily, scheduled  -regular diet  -lovenox  -encourage ambulation  - Pull chest tube today  - Follow up CXR at 5 PM to assess for interval changes after chest tube pulled  - If breathing remains stable and CXR clear after chest tube pull and sensorium improves, discharge to home tomorrow with 24/7 supervision per PT/OT recs    Seen with fellow, will discuss with staff.    Ange Carney MD  General Surgery PGY-1

## 2018-12-03 NOTE — OR NURSING
Sedation medication found in medication lock box in procedure room 6 where pt had bronchoscopy. Pharmacy informed. Returned 2 vials, 4 mg of Versed and 2 vials, 200 mcg of Fentanyl, and 50 mg of Benadryl in pyxis with another RN, Zaida Reyes. Also wasted Versed 4 mg/ 4 ml and Fentanyl 200 mcg/ 4 mls that were drawn up in syringes and labeled. Witnessed by Zaida Reyes RN.

## 2018-12-03 NOTE — PLAN OF CARE
Problem: Patient Care Overview  Goal: Plan of Care/Patient Progress Review  Outcome: No Change  VSS, SR 80s.  A&Ox4, on RA.  Pt complains of persistent cough made better w/ PRN robitussin.  R CT to waterseal w/ no output during shift.  CT dressing changed.  R arm with limited movement.  Pt up SBA.  VPM in place for safety.  Potassium (3.6) replaced.  Will continue to monitor and notify medical team with any changes or concerns.

## 2018-12-03 NOTE — PROGRESS NOTES
Pt found walking out of room. Stated he was leaving hospital and that he no longer required medical care. Thoracic notified of pt's intent and team came to see pt. Thoracic explained risks to pt of leaving hospital AMA. Pt stated he understood and that he was still leaving. Pt signed AMA paperwork and left with personal rideGabby. Pt declined any discharge teaching or instructions.

## 2018-12-03 NOTE — PROGRESS NOTES
Dynamic Gait Index (DGI):The DGI is a measure of balance during gait that is reliable and valid for the elderly and individuals with Parkinson's disease, MS, vestibular disorders, or s/p stroke. Gait assistive device used: none     Patient score: 22/24  Scores ?19/24 indicate an increased risk for falls according to Ana Maria et al 2000  Minimal Detectable Change = 2.9 in community dwelling elderly according to Sacha et al 2011    Assessment (rationale for performing, application to patient s function & care plan):  DGI administered in order to assess falls risk and aid in d/c planning.   Minutes billed as physical performance test: 10

## 2018-12-04 ENCOUNTER — PATIENT OUTREACH (OUTPATIENT)
Dept: CARE COORDINATION | Facility: CLINIC | Age: 48
End: 2018-12-04

## 2018-12-04 LAB
BACTERIA SPEC CULT: NO GROWTH
BACTERIA SPEC CULT: NO GROWTH
Lab: NORMAL
Lab: NORMAL
SPECIMEN SOURCE: NORMAL
SPECIMEN SOURCE: NORMAL
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
## 2018-12-14 ENCOUNTER — TELEPHONE (OUTPATIENT)
Dept: SURGERY | Facility: CLINIC | Age: 48
End: 2018-12-14

## 2018-12-14 ENCOUNTER — TELEPHONE (OUTPATIENT)
Dept: ONCOLOGY | Facility: CLINIC | Age: 48
End: 2018-12-14

## 2018-12-14 NOTE — TELEPHONE ENCOUNTER
Received call from [facility] nurse, from Adele Mishra office.  Dr. Gallagher and  Adele Mishra NP, had spoken today.  Clinic nurse calls to state patient will start antibiotic tonight and is supposed to get scheduled with [facility] Thoracic for Next Wednesday.   Write unable to locate any documentation regarding this, and unsure which provider.  Patient is currently scheduled to see Thoracic on Wednesday 12/26.  Will route message to thoracic

## 2018-12-14 NOTE — TELEPHONE ENCOUNTER
[facility] MOHAN Mishra has requested Dr. Gallagher call her regarding imaging results she has ordered today and discuss inpatient imaging and his surgery from 11/26 - patient left AMA and has no follow up until 12/26/18    Paged Dr. Gallagher to call NP at [phone]

## 2018-12-19 ENCOUNTER — ANCILLARY PROCEDURE (OUTPATIENT)
Dept: GENERAL RADIOLOGY | Facility: CLINIC | Age: 48
End: 2018-12-19
Attending: THORACIC SURGERY (CARDIOTHORACIC VASCULAR SURGERY)
Payer: COMMERCIAL

## 2018-12-19 ENCOUNTER — OFFICE VISIT (OUTPATIENT)
Dept: SURGERY | Facility: CLINIC | Age: 48
End: 2018-12-19
Attending: THORACIC SURGERY (CARDIOTHORACIC VASCULAR SURGERY)
Payer: COMMERCIAL

## 2018-12-19 VITALS
SYSTOLIC BLOOD PRESSURE: 100 MMHG | HEART RATE: 91 BPM | OXYGEN SATURATION: 96 % | TEMPERATURE: 98.2 F | DIASTOLIC BLOOD PRESSURE: 67 MMHG | WEIGHT: 268 LBS | BODY MASS INDEX: 38.45 KG/M2

## 2018-12-19 DIAGNOSIS — J98.6 PARALYZED HEMIDIAPHRAGM: ICD-10-CM

## 2018-12-19 DIAGNOSIS — J98.6 DIAPHRAGM PARALYSIS: Primary | ICD-10-CM

## 2018-12-19 DIAGNOSIS — J98.6 DIAPHRAGM PARALYSIS: ICD-10-CM

## 2018-12-19 PROCEDURE — 40000114 ZZH STATISTIC NO CHARGE CLINIC VISIT

## 2018-12-19 RX ORDER — OXYCODONE HYDROCHLORIDE 5 MG/1
5 TABLET ORAL EVERY 6 HOURS PRN
Qty: 28 TABLET | Refills: 0 | Status: SHIPPED | OUTPATIENT
Start: 2018-12-19 | End: 2018-12-19

## 2018-12-19 RX ORDER — OXYCODONE HYDROCHLORIDE 5 MG/1
5 TABLET ORAL EVERY 6 HOURS PRN
Qty: 28 TABLET | Refills: 0 | Status: SHIPPED | OUTPATIENT
Start: 2018-12-19 | End: 2018-12-26

## 2018-12-19 ASSESSMENT — PAIN SCALES - GENERAL: PAINLEVEL: MODERATE PAIN (4)

## 2018-12-19 NOTE — LETTER
12/19/2018       RE: Hal Clark  [address]  [place] [address]     Dear Colleague,    Thank you for referring your patient, Hal Clark, to the [facility]. Please see a copy of my visit note below.    THORACIC SURGERY FOLLOW UP VISIT     Dear Adele Driscoll,  I saw Mr. Clark in follow-up today. The clinical summary follows:      PREOP DIAGNOSIS   Right diaphragm paralysis.         PROCEDURE   11/26/18 Right diaphragm plication through thoracotomy.      COMPLICATIONS  Left AMA  IVON, resolved   Hospital delirium     INTERVAL STUDIES  12/19/18 CXR: Right basilar opacities, right pleural effusion.             ETOH None since he quit in November.   TOB None   BMI  40    Physical exam:   Vitals: No tachycardia. O2 sat 96% on RA.   Right thoracotomy incision well approximated. No signs of infection. He has a small seroma at the thoracotomy incision.      SUBJECTIVE   Mr. Clark presents for his first post op appointment. He presented at an outside hospital in [place] after leaving [place] with BLE edema. He was prescribed diuretics with good response. On follow up he was found to have right lung opacities and he was prescribed Levofloxacin. Denies fever or chills. Exertional dyspnea is slightly better from preop. He does complain of right chest pleuritic chest pain, only taking tylenol and ibuprofen at home. He has mild dry cough.      From a personal perspective, he comes to clinic with his girlfriend Gabby. 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 He would like to get back to work when his dyspnea gets better.        IMPRESSION (J98.6) Diaphragm paralysis  (primary encounter diagnosis)  48 year-old male 3 weeks after right diaphragm plication through thoracotomy.      PLAN  I reviewed the plan as follows:  1) Pain control: Oxycodone 5 mg q 6 hrs PRN, (28 tabs). Instructed to avoid Ibuprofen given recent IVON. Ok for Acetaminophen.    2) Right diaphragm paralysis:  RTC in 1 month with PA & lateral CXR, upright and supine PFTs and SGQ.   3) Encouraged to continue with ETOH abstinence.   4) Outpatient PT/OT: He will contact his PT/OT team he use to see preop to get involved again.   They had all their questions answered and were in agreement with the plan.  I appreciate the opportunity to participate in the care of your patient and will keep you updated.      Isaiah Gallagher MD

## 2018-12-19 NOTE — PROGRESS NOTES
THORACIC SURGERY FOLLOW UP VISIT     Dear Adele Driscoll,  I saw Mr. Clark in follow-up today. The clinical summary follows:      PREOP DIAGNOSIS   Right diaphragm paralysis.         PROCEDURE   11/26/18 Right diaphragm plication through thoracotomy.      COMPLICATIONS  Left AMA  IVON, resolved   Hospital delirium     INTERVAL STUDIES  12/19/18 CXR: Right basilar opacities, right pleural effusion.             ETOH None since he quit in November.   TOB None   BMI  40    Physical exam:   Vitals: No tachycardia. O2 sat 96% on RA.   Right thoracotomy incision well approximated. No signs of infection. He has a small seroma at the thoracotomy incision.      SUBJECTIVE   Mr. Clark presents for his first post op appointment. He presented at an outside hospital in [place] after leaving [place] with BLE edema. He was prescribed diuretics with good response. On follow up he was found to have right lung opacities and he was prescribed Levofloxacin. Denies fever or chills. Exertional dyspnea is slightly better from preop. He does complain of right chest pleuritic chest pain, only taking tylenol and ibuprofen at home. He has mild dry cough.      From a personal perspective, he comes to clinic with his girlfriend Gabby. 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 He would like to get back to work when his dyspnea gets better.        IMPRESSION (J98.6) Diaphragm paralysis  (primary encounter diagnosis)  48 year-old male 3 weeks after right diaphragm plication through thoracotomy.      PLAN  I reviewed the plan as follows:  1) Pain control: Oxycodone 5 mg q 6 hrs PRN, (28 tabs). Instructed to avoid Ibuprofen given recent IVON. Ok for Acetaminophen.    2) Right diaphragm paralysis: RTC in 1 month with PA & lateral CXR, upright and supine PFTs and SGQ.   3) Encouraged to continue with ETOH abstinence.   4) Outpatient PT/OT: He will contact his PT/OT team he use to see preop to get involved again.   They had  all their questions answered and were in agreement with the plan.  I appreciate the opportunity to participate in the care of your patient and will keep you updated.  Sincerely,

## 2018-12-21 DIAGNOSIS — J98.6 DIAPHRAGM PARALYSIS: Primary | ICD-10-CM

## 2018-12-28 LAB
FUNGUS SPEC CULT: NORMAL
SPECIMEN SOURCE: NORMAL

## 2019-01-23 ENCOUNTER — ANCILLARY PROCEDURE (OUTPATIENT)
Dept: GENERAL RADIOLOGY | Facility: CLINIC | Age: 49
End: 2019-01-23
Payer: COMMERCIAL

## 2019-01-23 ENCOUNTER — OFFICE VISIT (OUTPATIENT)
Dept: SURGERY | Facility: CLINIC | Age: 49
End: 2019-01-23
Attending: THORACIC SURGERY (CARDIOTHORACIC VASCULAR SURGERY)
Payer: COMMERCIAL

## 2019-01-23 VITALS
HEIGHT: 70 IN | DIASTOLIC BLOOD PRESSURE: 86 MMHG | RESPIRATION RATE: 16 BRPM | WEIGHT: 279 LBS | OXYGEN SATURATION: 96 % | SYSTOLIC BLOOD PRESSURE: 119 MMHG | BODY MASS INDEX: 39.94 KG/M2 | TEMPERATURE: 98.1 F | HEART RATE: 83 BPM

## 2019-01-23 DIAGNOSIS — J01.10 ACUTE NON-RECURRENT FRONTAL SINUSITIS: Primary | ICD-10-CM

## 2019-01-23 DIAGNOSIS — J98.6 DIAPHRAGM PARALYSIS: ICD-10-CM

## 2019-01-23 DIAGNOSIS — J98.6 PARALYSIS, DIAPHRAGM: Primary | ICD-10-CM

## 2019-01-23 DIAGNOSIS — Z98.890 S/P PLICATION OF DIAPHRAGM: ICD-10-CM

## 2019-01-23 DIAGNOSIS — J98.6 PARALYZED HEMIDIAPHRAGM: ICD-10-CM

## 2019-01-23 PROCEDURE — G0463 HOSPITAL OUTPT CLINIC VISIT: HCPCS | Mod: ZF

## 2019-01-23 RX ORDER — LEVOFLOXACIN 500 MG/1
500 TABLET, FILM COATED ORAL DAILY
Status: DISCONTINUED | OUTPATIENT
Start: 2019-01-23 | End: 2019-01-23

## 2019-01-23 RX ORDER — LEVOFLOXACIN 500 MG/1
500 TABLET, FILM COATED ORAL DAILY
Status: DISCONTINUED | OUTPATIENT
Start: 2019-01-24 | End: 2019-01-23

## 2019-01-23 ASSESSMENT — MIFFLIN-ST. JEOR: SCORE: 2141.79

## 2019-01-23 ASSESSMENT — PAIN SCALES - GENERAL: PAINLEVEL: NO PAIN (1)

## 2019-01-23 NOTE — NURSING NOTE
"Oncology Rooming Note    January 23, 2019 11:12 AM   Hal Clark is a 48 year old male who presents for:    Chief Complaint   Patient presents with     Oncology Clinic Visit     Return Diaphragm Paralysis     Initial Vitals: /86   Pulse 83   Temp 98.1  F (36.7  C) (Oral)   Resp 16   Ht 1.778 m (5' 10\")   Wt 126.6 kg (279 lb)   SpO2 96%   BMI 40.03 kg/m   Estimated body mass index is 40.03 kg/m  as calculated from the following:    Height as of this encounter: 1.778 m (5' 10\").    Weight as of this encounter: 126.6 kg (279 lb). Body surface area is 2.5 meters squared.  No Pain (1) Comment: right side tenderness   No LMP for male patient.  Allergies reviewed: Yes  Medications reviewed: Yes    Medications: Medication refills not needed today.  Pharmacy name entered into Engage: [id number] - [place], [place] - [address] [address],     Clinical concerns: Chest xray results; PFT results     6 minutes for nursing intake (face to face time)     Madison Massey CMA              "

## 2019-01-23 NOTE — LETTER
1/23/2019       RE: Hal Clark  [address]  [place] [address]     Dear Colleague,    Thank you for referring your patient, Hal Clark, to the [facility]. Please see a copy of my visit note below.    THORACIC SURGERY FOLLOW UP VISIT     Dear Dr. Mishra,    I saw Mr. Clark in follow-up today. The clinical summary follows:      PREOP DIAGNOSIS   Right diaphragm paralysis    PROCEDURE   11/26/18 Right diaphragm plication through thoracotomy     COMPLICATIONS  Left AMA  IVON, resolved   Hospital delirium     INTERVAL STUDIES    PFT preop: FEV1 = 1.1    PFT 1/23/19:    Chest xray 1/23/19:       ETOH  none since he quit in November.   TOB  none   BMI 40     Physical exam:     SUBJECTIVE   Mr. Clark presents for his second post op appointment. Two weeks ago, he has viral upper respiratory symptoms of cough, rhinorrhea and sneezing.  It puts stress on his incision but pain is otherwise well controlled.  His shortness of breath is markedly improved and he is able to lie flat at night with minimal shortness of breath    From a personal perspective, he comes to clinic with his friend, Gabby.    IMPRESSION (J01.10) Acute non-recurrent frontal sinusitis  (primary encounter diagnosis)  (J98.6) Diaphragm paralysis  (Z98.890) S/P plication of diaphragm    48 year-old male 7 weeks s/p RIGHT diaphragm plication for diaphragm paralysis  Sinusitis.     PLAN  I reviewed the plan as follows:  1) Pain control: tylenol prn  2) Right diaphragm paralysis: follow-up in 1 year with CXR and PFT  3) Upper respiratory infection: levofloxacin    They had all their questions answered and were in agreement with the plan.  I appreciate the opportunity to participate in the care of your patient and will keep you updated.  Sincerely,        Kolton Robbins MD

## 2019-01-23 NOTE — PROGRESS NOTES
THORACIC SURGERY FOLLOW UP VISIT     Dear Dr. Mishra,    I saw Mr. Clark in follow-up today. The clinical summary follows:      PREOP DIAGNOSIS   Right diaphragm paralysis    PROCEDURE   11/26/18 Right diaphragm plication through thoracotomy     COMPLICATIONS  Left AMA  IVON, resolved   Hospital delirium     INTERVAL STUDIES    PFT preop: FEV1 = 1.1    PFT 1/23/19:    Chest xray 1/23/19:       ETOH none since he quit in November.   TOB none   BMI 40     Physical exam:     SUBJECTIVE   Mr. Clark presents for his second post op appointment. Two weeks ago, he has viral upper respiratory symptoms of cough, rhinorrhea and sneezing.  It puts stress on his incision but pain is otherwise well controlled.  His shortness of breath is markedly improved and he is able to lie flat at night with minimal shortness of breath    From a personal perspective, he comes to clinic with his friend, Gabby.    IMPRESSION (J01.10) Acute non-recurrent frontal sinusitis  (primary encounter diagnosis)  (J98.6) Diaphragm paralysis  (Z98.890) S/P plication of diaphragm    48 year-old male 7 weeks s/p RIGHT diaphragm plication for diaphragm paralysis  Sinusitis.     PLAN  I reviewed the plan as follows:  1) Pain control: tylenol prn  2) Right diaphragm paralysis: follow-up in 1 year with CXR and PFT  3) Upper respiratory infection: levofloxacin    They had all their questions answered and were in agreement with the plan.  I appreciate the opportunity to participate in the care of your patient and will keep you updated.  Sincerely,

## 2019-01-25 DIAGNOSIS — R05.9 COUGH: ICD-10-CM

## 2019-01-25 DIAGNOSIS — J32.9 SINUSITIS: Primary | ICD-10-CM

## 2019-01-25 RX ORDER — LEVOFLOXACIN 500 MG/1
500 TABLET, FILM COATED ORAL DAILY
Qty: 10 TABLET | Refills: 0 | Status: SHIPPED | OUTPATIENT
Start: 2019-01-25 | End: 2019-02-04

## 2019-01-29 LAB
DLCOUNC-%PRED-PRE: 64 %
DLCOUNC-PRE: 20.08 ML/MIN/MMHG
DLCOUNC-PRED: 31.14 ML/MIN/MMHG
ERV-%PRED-PRE: 114 %
ERV-PRE: 0.9 L
ERV-PRED: 0.78 L
EXPTIME-PRE: 6.75 SEC
FEF2575-%PRED-POST: 15 %
FEF2575-%PRED-PRE: 32 %
FEF2575-POST: 0.57 L/SEC
FEF2575-PRE: 1.2 L/SEC
FEF2575-PRED: 3.68 L/SEC
FEFMAX-%PRED-PRE: 67 %
FEFMAX-PRE: 6.67 L/SEC
FEFMAX-PRED: 9.91 L/SEC
FEV1-%PRED-PRE: 42 %
FEV1-PRE: 1.71 L
FEV1FEV6-PRE: 75 %
FEV1FEV6-PRED: 81 %
FEV1FVC-PRE: 73 %
FEV1FVC-PRED: 78 %
FEV1SVC-PRED: 76 %
FIFMAX-PRE: 3.64 L/SEC
FRCPLETH-%PRED-PRE: 66 %
FRCPLETH-PRE: 2.31 L
FRCPLETH-PRED: 3.5 L
FVC-%PRED-PRE: 46 %
FVC-PRE: 2.35 L
FVC-PRED: 5.04 L
IC-%PRED-PRE: 39 %
IC-PRE: 1.78 L
IC-PRED: 4.48 L
RVPLETH-%PRED-PRE: 65 %
RVPLETH-PRE: 1.42 L
RVPLETH-PRED: 2.16 L
TLCPLETH-%PRED-PRE: 57 %
TLCPLETH-PRE: 4.09 L
TLCPLETH-PRED: 7.13 L
VA-%PRED-PRE: 57 %
VA-PRE: 3.95 L
VC-%PRED-PRE: 50 %
VC-PRE: 2.67 L
VC-PRED: 5.26 L

## 2020-03-11 ENCOUNTER — HEALTH MAINTENANCE LETTER (OUTPATIENT)
Age: 50
End: 2020-03-11

## 2021-01-03 ENCOUNTER — HEALTH MAINTENANCE LETTER (OUTPATIENT)
Age: 51
End: 2021-01-03

## 2021-04-25 ENCOUNTER — HEALTH MAINTENANCE LETTER (OUTPATIENT)
Age: 51
End: 2021-04-25

## 2021-10-10 ENCOUNTER — HEALTH MAINTENANCE LETTER (OUTPATIENT)
Age: 51
End: 2021-10-10

## 2022-05-21 ENCOUNTER — HEALTH MAINTENANCE LETTER (OUTPATIENT)
Age: 52
End: 2022-05-21

## 2022-09-18 ENCOUNTER — HEALTH MAINTENANCE LETTER (OUTPATIENT)
Age: 52
End: 2022-09-18

## 2023-06-04 ENCOUNTER — HEALTH MAINTENANCE LETTER (OUTPATIENT)
Age: 53
End: 2023-06-04

## (undated) DEVICE — SPONGE TONSIL W/STRING MED 23275-680

## (undated) DEVICE — PREP CHLORAPREP 26ML TINTED ORANGE  260815

## (undated) DEVICE — ESU ELEC BLADE 6" COATED/INSULATED E1455-6

## (undated) DEVICE — SU SILK 0 SH 30" K834H

## (undated) DEVICE — ESU GROUND PAD ADULT W/CORD E7507

## (undated) DEVICE — BONE WAX 2.5GM W31G

## (undated) DEVICE — DRAIN CHEST TUBE RIGHT ANGLED 28FR 8128

## (undated) DEVICE — SU DERMABOND ADVANCED .7ML DNX12

## (undated) DEVICE — SOL WATER IRRIG 1000ML BOTTLE 2F7114

## (undated) DEVICE — SUCTION TIP YANKAUER STR K87

## (undated) DEVICE — COVER CAMERA IN-LIGHT DISP LT-C02

## (undated) DEVICE — SYR BULB IRRIG 50ML LATEX FREE 0035280

## (undated) DEVICE — SU VICRYL 3-0 FS-1 27" J442H

## (undated) DEVICE — BLADE CLIPPER SGL USE 9680

## (undated) DEVICE — SU VICRYL 2-0 CT-1 27" UND J259H

## (undated) DEVICE — WIPES FOLEY CARE SURESTEP PROVON DFC100

## (undated) DEVICE — SPONGE KITTNER 30-101

## (undated) DEVICE — LINEN GOWN XLG 5407

## (undated) DEVICE — BLADE KNIFE SURG 10 371110

## (undated) DEVICE — TOURNIQUET VASCULAR KIT 7 1/2" 79012

## (undated) DEVICE — Device

## (undated) DEVICE — SPONGE LAP 18X18" X8435

## (undated) DEVICE — SUCTION MANIFOLD DORNOCH ULTRA CART UL-CL500

## (undated) DEVICE — TAPE CLOTH 3" CARDINAL 3TRCL03

## (undated) DEVICE — LIGHT HANDLE X1 31140133

## (undated) DEVICE — DRAPE IOBAN INCISE 23X17" 6650EZ

## (undated) DEVICE — SU TICRON 2 GS-21DA 36" 3146-81

## (undated) DEVICE — CATH TRAY FOLEY SURESTEP 16FR W/URNE MTR STLK LATEX A303316A

## (undated) DEVICE — TIES BANDING T50R

## (undated) DEVICE — TUBING SUCTION 10'X3/16" N510

## (undated) DEVICE — SU VICRYL 0 CTX 36" J370H

## (undated) DEVICE — LINEN TOWEL PACK X6 WHITE 5487

## (undated) DEVICE — SU ETHIBOND 5 LRDA 30" B499T

## (undated) DEVICE — DRSG DRAIN 2X2" 7087

## (undated) DEVICE — LINEN TOWEL PACK X5 5464

## (undated) DEVICE — SOL NACL 0.9% IRRIG 1000ML BOTTLE 2F7124

## (undated) DEVICE — SU PLEDGET SOFT TFE 13MMX7MMX1.5MM D7044

## (undated) DEVICE — SUCTION DRY CHEST DRAIN OASIS 3600-100

## (undated) RX ORDER — PROPOFOL 10 MG/ML
INJECTION, EMULSION INTRAVENOUS
Status: DISPENSED
Start: 2018-11-26

## (undated) RX ORDER — CEFAZOLIN SODIUM IN 0.9 % NACL 3 G/100 ML
INTRAVENOUS SOLUTION, PIGGYBACK (ML) INTRAVENOUS
Status: DISPENSED
Start: 2018-11-26

## (undated) RX ORDER — FENTANYL CITRATE 50 UG/ML
INJECTION, SOLUTION INTRAMUSCULAR; INTRAVENOUS
Status: DISPENSED
Start: 2018-11-26

## (undated) RX ORDER — BUPIVACAINE HYDROCHLORIDE 2.5 MG/ML
INJECTION, SOLUTION EPIDURAL; INFILTRATION; INTRACAUDAL
Status: DISPENSED
Start: 2018-11-26

## (undated) RX ORDER — HYDROMORPHONE HYDROCHLORIDE 1 MG/ML
INJECTION, SOLUTION INTRAMUSCULAR; INTRAVENOUS; SUBCUTANEOUS
Status: DISPENSED
Start: 2018-11-26

## (undated) RX ORDER — DIPHENHYDRAMINE HYDROCHLORIDE 50 MG/ML
INJECTION INTRAMUSCULAR; INTRAVENOUS
Status: DISPENSED
Start: 2018-11-30

## (undated) RX ORDER — ACETAMINOPHEN 325 MG/1
TABLET ORAL
Status: DISPENSED
Start: 2018-11-26

## (undated) RX ORDER — LIDOCAINE HYDROCHLORIDE 40 MG/ML
SOLUTION TOPICAL
Status: DISPENSED
Start: 2018-11-30

## (undated) RX ORDER — LIDOCAINE HYDROCHLORIDE 20 MG/ML
INJECTION, SOLUTION EPIDURAL; INFILTRATION; INTRACAUDAL; PERINEURAL
Status: DISPENSED
Start: 2018-11-26

## (undated) RX ORDER — GABAPENTIN 300 MG/1
CAPSULE ORAL
Status: DISPENSED
Start: 2018-11-26

## (undated) RX ORDER — LIDOCAINE HYDROCHLORIDE 10 MG/ML
INJECTION, SOLUTION INFILTRATION; PERINEURAL
Status: DISPENSED
Start: 2018-11-30

## (undated) RX ORDER — DEXTROSE MONOHYDRATE, SODIUM CHLORIDE, AND POTASSIUM CHLORIDE 50; 1.49; 4.5 G/1000ML; G/1000ML; G/1000ML
INJECTION, SOLUTION INTRAVENOUS
Status: DISPENSED
Start: 2018-11-26

## (undated) RX ORDER — ALBUTEROL SULFATE 0.83 MG/ML
SOLUTION RESPIRATORY (INHALATION)
Status: DISPENSED
Start: 2018-11-30

## (undated) RX ORDER — FENTANYL CITRATE 50 UG/ML
INJECTION, SOLUTION INTRAMUSCULAR; INTRAVENOUS
Status: DISPENSED
Start: 2018-11-30

## (undated) RX ORDER — GLYCOPYRROLATE 0.2 MG/ML
INJECTION, SOLUTION INTRAMUSCULAR; INTRAVENOUS
Status: DISPENSED
Start: 2018-11-26